# Patient Record
Sex: FEMALE | Race: WHITE | NOT HISPANIC OR LATINO | Employment: FULL TIME | URBAN - METROPOLITAN AREA
[De-identification: names, ages, dates, MRNs, and addresses within clinical notes are randomized per-mention and may not be internally consistent; named-entity substitution may affect disease eponyms.]

---

## 2018-01-29 PROBLEM — E88.819 INSULIN RESISTANCE: Status: ACTIVE | Noted: 2018-01-29

## 2018-02-06 ENCOUNTER — TELEPHONE (OUTPATIENT)
Dept: CARDIOLOGY | Facility: CLINIC | Age: 28
End: 2018-02-06

## 2018-02-06 NOTE — TELEPHONE ENCOUNTER
----- Message from Guru Thacker sent at 2/5/2018  4:10 PM CST -----  The pt is seeking to have an appt scheduled 2/9th for an echocardiogram.  An appt is not available until 2/12/18.  The pt can be reached at [phone]

## 2018-02-06 NOTE — TELEPHONE ENCOUNTER
Returned call to schedule echocardiogram as per Dr. Edmondson.  Pt had requested Feb 9th, offered her appt at either 7am or 7:30am.  She initially did not want to schedule, needs to know copay amount (states she has high deductible plan).  Discussed options, offered to have financial counselor reach out to pt.  She agreed to schedule echocardiogram at [facility], Feb 9, 7:30am.

## 2018-11-06 PROBLEM — F41.9 ANXIETY: Status: ACTIVE | Noted: 2018-11-06

## 2018-11-08 PROBLEM — Z79.899 HIGH RISK MEDICATION USE: Status: ACTIVE | Noted: 2018-11-08

## 2019-11-14 ENCOUNTER — PATIENT MESSAGE (OUTPATIENT)
Dept: ENDOCRINOLOGY | Facility: CLINIC | Age: 29
End: 2019-11-14

## 2021-04-29 ENCOUNTER — PATIENT MESSAGE (OUTPATIENT)
Dept: RESEARCH | Facility: HOSPITAL | Age: 31
End: 2021-04-29

## 2021-10-29 RX ORDER — CLOMIPHENE CITRATE 50 MG/1
TABLET ORAL
COMMUNITY
Start: 2021-05-04 | End: 2021-11-01

## 2021-10-29 RX ORDER — CYCLOBENZAPRINE HCL 10 MG
TABLET ORAL
COMMUNITY
End: 2021-11-16

## 2021-10-29 RX ORDER — NORETHINDRONE ACETATE AND ETHINYL ESTRADIOL AND FERROUS FUMARATE 1MG-20(21)
KIT ORAL
COMMUNITY
Start: 2021-05-28 | End: 2021-11-16

## 2021-10-29 RX ORDER — ONDANSETRON 4 MG/1
TABLET, ORALLY DISINTEGRATING ORAL
COMMUNITY
End: 2021-11-16

## 2021-10-29 RX ORDER — ONDANSETRON 4 MG/1
TABLET, ORALLY DISINTEGRATING ORAL
COMMUNITY
Start: 2021-10-16 | End: 2022-05-04

## 2021-10-29 RX ORDER — NIFEDIPINE 60 MG/1
TABLET, EXTENDED RELEASE ORAL
COMMUNITY
Start: 2020-11-02 | End: 2021-11-16

## 2021-10-29 RX ORDER — CITALOPRAM 10 MG/1
TABLET ORAL
COMMUNITY
End: 2021-11-16 | Stop reason: SDUPTHER

## 2021-10-29 RX ORDER — MEDROXYPROGESTERONE ACETATE 10 MG/1
TABLET ORAL
COMMUNITY
Start: 2021-07-06 | End: 2021-11-16

## 2021-10-29 RX ORDER — DILTIAZEM HYDROCHLORIDE 60 MG/1
TABLET, FILM COATED ORAL
COMMUNITY
End: 2021-11-16

## 2021-10-29 RX ORDER — LEUCOVORIN/PYRIDOX/MECOBALAMIN 4-50-2 MG
TABLET ORAL
COMMUNITY
End: 2021-11-16 | Stop reason: SDUPTHER

## 2021-10-29 RX ORDER — PROGESTERONE 200 MG/1
CAPSULE ORAL
COMMUNITY
Start: 2021-10-15 | End: 2021-11-16 | Stop reason: SDUPTHER

## 2021-10-29 RX ORDER — PROGESTERONE 200 MG/1
CAPSULE ORAL
COMMUNITY
End: 2022-03-07

## 2021-10-29 RX ORDER — NORETHINDRONE ACETATE AND ETHINYL ESTRADIOL 1MG-20(21)
KIT ORAL
COMMUNITY
Start: 2021-03-17 | End: 2021-11-16

## 2021-10-29 RX ORDER — CITALOPRAM 10 MG/1
TABLET ORAL
COMMUNITY
Start: 2021-10-16 | End: 2022-01-06

## 2021-10-29 RX ORDER — PROMETHAZINE HYDROCHLORIDE 25 MG/1
TABLET ORAL
COMMUNITY
End: 2022-05-04

## 2021-10-29 RX ORDER — METFORMIN HYDROCHLORIDE 500 MG/1
TABLET, EXTENDED RELEASE ORAL
COMMUNITY
End: 2021-11-16 | Stop reason: SDUPTHER

## 2021-10-29 RX ORDER — PROMETHAZINE HYDROCHLORIDE 25 MG/1
TABLET ORAL
COMMUNITY
Start: 2021-10-16 | End: 2021-11-16 | Stop reason: SDUPTHER

## 2021-10-29 RX ORDER — METFORMIN HYDROCHLORIDE 500 MG/1
TABLET, EXTENDED RELEASE ORAL
COMMUNITY
Start: 2021-10-16 | End: 2022-06-16 | Stop reason: SDUPTHER

## 2021-10-29 RX ORDER — ASCORBIC ACID, CHOLECALCIFEROL, DL-.ALPHA.-TOCOPHEROL ACETATE, THIAMINE HYDROCHLORIDE, RIBOFLAVIN, NIACINAMIDE, PYRIDOXINE HYDROCHLORIDE, FOLIC ACID, CYANOCOBALAMIN, CALCIUM CARBONATE, FERROUS FUMARATE, ZINC OXIDE, CUPRIC SULFATE 100; 400; 30; 1.6; 1.6; 20; 3.1; 1; 12; 200; 27; 10; 2 MG/1; [IU]/1; [IU]/1; MG/1; MG/1; MG/1; MG/1; MG/1; UG/1; MG/1; MG/1; MG/1; MG/1
TABLET, COATED ORAL
COMMUNITY
Start: 2020-10-15 | End: 2022-03-07

## 2021-10-29 RX ORDER — SERTRALINE HYDROCHLORIDE 50 MG/1
TABLET, FILM COATED ORAL
COMMUNITY
Start: 2021-01-04 | End: 2021-11-16

## 2021-11-08 ENCOUNTER — PATIENT MESSAGE (OUTPATIENT)
Dept: ADMINISTRATIVE | Facility: OTHER | Age: 31
End: 2021-11-08
Payer: COMMERCIAL

## 2021-11-15 ENCOUNTER — PATIENT MESSAGE (OUTPATIENT)
Dept: ADMINISTRATIVE | Facility: OTHER | Age: 31
End: 2021-11-15
Payer: COMMERCIAL

## 2022-04-25 DIAGNOSIS — G51.0 BELL PALSY: Primary | ICD-10-CM

## 2022-04-26 ENCOUNTER — TELEPHONE (OUTPATIENT)
Dept: NEUROLOGY | Facility: CLINIC | Age: 32
End: 2022-04-26
Payer: COMMERCIAL

## 2022-04-26 NOTE — TELEPHONE ENCOUNTER
----- Message from Piedad Munson MD sent at 4/25/2022  5:04 PM CDT -----  Regarding: RE: Postpartum, suspect Bell's Palsy  Sure.     Let's schedule her for facial NCS and see her ASAP.      ----- Message -----  From: Dorcas García MD  Sent: 4/25/2022   4:24 PM CDT  To: Piedda Munson MD  Subject: Postpartum, suspect Bell's Palsy                 Good Afternoon,  My name is Dorcas García and I am an Ob/gyn with [facility].  My pt has h/o hypertension, pre eclampsia and psuedotumor who delivered by c/s 4 days ago for pre eclampsia.  On postpartum day # 1 she developed facial droop. With Neg CT.  Pt now on 2 bp meds and not in severe range but had some in severe range while in hospital and required iv bp meds.  I started her on Valtrex and steroids.  She is also having significant ear pain requiring oxycodone 10 mg every 3 hours.  When we called for appt with [facility] neurology,  the soonest available was going to be in October.  Is there any way that she could be seen sooner?  Please let me know as soon as you find out so I can help coordinate her care.      Thanks,   Dorcas García MD  [phone]

## 2022-04-28 ENCOUNTER — PROCEDURE VISIT (OUTPATIENT)
Dept: NEUROLOGY | Facility: CLINIC | Age: 32
End: 2022-04-28
Payer: COMMERCIAL

## 2022-04-28 DIAGNOSIS — G51.0 BELL PALSY: ICD-10-CM

## 2022-04-28 PROCEDURE — 95907 NVR CNDJ TST 1-2 STUDIES: CPT | Mod: S$GLB,,, | Performed by: PSYCHIATRY & NEUROLOGY

## 2022-04-28 PROCEDURE — 95907 PR NERVE CONDUCTION STUDY; 1-2 STUDIES: ICD-10-PCS | Mod: S$GLB,,, | Performed by: PSYCHIATRY & NEUROLOGY

## 2022-04-29 PROBLEM — G51.0 BELL PALSY: Status: ACTIVE | Noted: 2022-04-29

## 2022-04-29 NOTE — PROCEDURES
[facility]   Ariel Gunn  Department of Neurology  [address] [place] [address] [place]  [address]  Phone [phone]     Fax  [phone]        Patient: Maximiliano Hicks YOB: 1990  Patient ID: [id number] Age: 31 Years 10 Months  Sex: Female Ref. Provider: Piedad Munson MD  Notes: NCS/Facial nerve/Bell's palsy/LT facial droop. C/O: Developed left facial droop postpartum day #1, s/p c/s 4/21/22 due to pre-eclampsia. PMHX: Pre-eclampsia, pseudotumor cerebri, HTN, gestational DM requiring insulin, labyrinthine disorder, anxiety.      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      The preservation and relative symmetry of the facial motor amplitudes implies a predominantly demyelinating lesion with a better prognosis.     ---------------------------------               Piedad Munson M.D., F.A.A.N.      Diplomate, American Board of Psychiatry and Neurology  Diplomate, American Board of Clinical Neurophysiology   Fellow, American Academy of Neurology       Motor NCS      Nerve / Sites Rec. Site Lat Amp Dist     ms mV cm   R FACIAL - Nasalis      Mastoid Nasalis 2.86 2.5 12   L FACIAL - Nasalis      Mastoid Nasalis 2.03 4.1 12   R FACIAL - OrbOculi      Mastoid OrbOculi 2.92 2.1 9   L FACIAL - OrbOculi      Mastoid OrbOculi 3.33 0.6 10   R FACIAL - OrbOris      Mastoid OrbOris 1.93 2.6 8.5   L FACIAL - OrbOris      Mastoid OrbOris 3.13 1.4 9

## 2022-05-03 ENCOUNTER — TELEPHONE (OUTPATIENT)
Dept: OTOLARYNGOLOGY | Facility: CLINIC | Age: 32
End: 2022-05-03

## 2022-05-03 ENCOUNTER — PATIENT MESSAGE (OUTPATIENT)
Dept: OTOLARYNGOLOGY | Facility: CLINIC | Age: 32
End: 2022-05-03

## 2022-05-03 ENCOUNTER — OFFICE VISIT (OUTPATIENT)
Dept: OTOLARYNGOLOGY | Facility: CLINIC | Age: 32
End: 2022-05-03
Payer: COMMERCIAL

## 2022-05-03 ENCOUNTER — CLINICAL SUPPORT (OUTPATIENT)
Dept: AUDIOLOGY | Facility: CLINIC | Age: 32
End: 2022-05-03
Payer: COMMERCIAL

## 2022-05-03 VITALS — WEIGHT: 220.88 LBS | TEMPERATURE: 98 F | BODY MASS INDEX: 40.4 KG/M2

## 2022-05-03 DIAGNOSIS — H93.12 LEFT-SIDED TINNITUS: Primary | ICD-10-CM

## 2022-05-03 DIAGNOSIS — G51.0 FACIAL NERVE PALSY: Primary | ICD-10-CM

## 2022-05-03 DIAGNOSIS — G89.29 CHRONIC FACIAL PAIN: ICD-10-CM

## 2022-05-03 DIAGNOSIS — R51.9 CHRONIC FACIAL PAIN: ICD-10-CM

## 2022-05-03 DIAGNOSIS — H93.12 TINNITUS, LEFT: ICD-10-CM

## 2022-05-03 PROCEDURE — 1159F MED LIST DOCD IN RCRD: CPT | Mod: CPTII,S$GLB,, | Performed by: OTOLARYNGOLOGY

## 2022-05-03 PROCEDURE — 92567 PR TYMPA2METRY: ICD-10-PCS | Mod: S$GLB,,,

## 2022-05-03 PROCEDURE — 92567 TYMPANOMETRY: CPT | Mod: S$GLB,,,

## 2022-05-03 PROCEDURE — 1160F RVW MEDS BY RX/DR IN RCRD: CPT | Mod: CPTII,S$GLB,, | Performed by: OTOLARYNGOLOGY

## 2022-05-03 PROCEDURE — 1160F PR REVIEW ALL MEDS BY PRESCRIBER/CLIN PHARMACIST DOCUMENTED: ICD-10-PCS | Mod: CPTII,S$GLB,, | Performed by: OTOLARYNGOLOGY

## 2022-05-03 PROCEDURE — 99999 PR PBB SHADOW E&M-EST. PATIENT-LVL IV: ICD-10-PCS | Mod: PBBFAC,,, | Performed by: OTOLARYNGOLOGY

## 2022-05-03 PROCEDURE — 99999 PR PBB SHADOW E&M-EST. PATIENT-LVL I: ICD-10-PCS | Mod: PBBFAC,,,

## 2022-05-03 PROCEDURE — 92553 PR AUDIOMETRY, AIR & BONE: ICD-10-PCS | Mod: S$GLB,,,

## 2022-05-03 PROCEDURE — 3008F BODY MASS INDEX DOCD: CPT | Mod: CPTII,S$GLB,, | Performed by: OTOLARYNGOLOGY

## 2022-05-03 PROCEDURE — 92553 AUDIOMETRY AIR & BONE: CPT | Mod: S$GLB,,,

## 2022-05-03 PROCEDURE — 3008F PR BODY MASS INDEX (BMI) DOCUMENTED: ICD-10-PCS | Mod: CPTII,S$GLB,, | Performed by: OTOLARYNGOLOGY

## 2022-05-03 PROCEDURE — 1159F PR MEDICATION LIST DOCUMENTED IN MEDICAL RECORD: ICD-10-PCS | Mod: CPTII,S$GLB,, | Performed by: OTOLARYNGOLOGY

## 2022-05-03 PROCEDURE — 99999 PR PBB SHADOW E&M-EST. PATIENT-LVL I: CPT | Mod: PBBFAC,,,

## 2022-05-03 PROCEDURE — 99204 OFFICE O/P NEW MOD 45 MIN: CPT | Mod: S$GLB,,, | Performed by: OTOLARYNGOLOGY

## 2022-05-03 PROCEDURE — 99204 PR OFFICE/OUTPT VISIT, NEW, LEVL IV, 45-59 MIN: ICD-10-PCS | Mod: S$GLB,,, | Performed by: OTOLARYNGOLOGY

## 2022-05-03 PROCEDURE — 99999 PR PBB SHADOW E&M-EST. PATIENT-LVL IV: CPT | Mod: PBBFAC,,, | Performed by: OTOLARYNGOLOGY

## 2022-05-03 RX ORDER — OXYCODONE HYDROCHLORIDE 10 MG/1
TABLET ORAL
COMMUNITY
Start: 2022-04-25 | End: 2022-06-20

## 2022-05-03 RX ORDER — GABAPENTIN 300 MG/1
300 CAPSULE ORAL 3 TIMES DAILY
Qty: 90 CAPSULE | Refills: 11 | OUTPATIENT
Start: 2022-05-03 | End: 2023-05-01

## 2022-05-03 RX ORDER — LABETALOL 200 MG/1
TABLET, FILM COATED ORAL
COMMUNITY
Start: 2022-04-25 | End: 2022-05-31 | Stop reason: SDUPTHER

## 2022-05-03 RX ORDER — VALACYCLOVIR HYDROCHLORIDE 1 G/1
TABLET, FILM COATED ORAL
COMMUNITY
Start: 2022-04-25 | End: 2022-06-20

## 2022-05-03 NOTE — PROGRESS NOTES
Referring Provider:    Stacyreferral Self  No address on file  Subjective:   Patient: Fabiola Viera [id number], :1990   Visit date:5/3/2022 9:58 AM    Chief Complaint:  Otalgia (Pain started a little before she had her baby, developed Bell's palsy, palsy has gotten better but pain in right ear causes her to take medicine every 6 hours for it)    HPI:    Prior notes reviewed by myself.  Clinical documentation obtained by nursing staff reviewed.     31-year-old female presents with complaints of left-sided facial weakness and left-sided facial pain.  She recently had a  and prior to delivery she had preeclampsia.  Shortly after delivery she developed a left-sided facial weakness and left-sided facial pain.  She was treated appropriately with valacyclovir as well as high-dose prednisone.  She denies any rash or skin changes at this time.  Since onset, the facial weakness has improved slightly but the pain has persisted and she is requiring scheduled narcotic and OTC pain medication.  She did have a CT scan at [facility] which she reports was normal although the official radiology report is not available.  She has had a nerve conduction study with Dr. Munson (neurology) but has not seen him for a clinic visit yet.  She reports intermittent nonpulsatile left-sided tinnitus.  She does not have any other significant otologic history      Objective:     Physical Exam:  Vitals:  Temp 97.7 °F (36.5 °C) (Temporal)   Wt 100.2 kg (220 lb 14.4 oz)   BMI 40.40 kg/m²   General appearance:  Well developed, well nourished    Ears:  Otoscopy of external auditory canals and tympanic membranes was normal, clinical speech reception thresholds grossly intact, no mass/lesion of auricle.    Nose:  No masses/lesions of external nose, nasal mucosa, septum, and turbinates were within normal limits.    Mouth:  No mass/lesion of lips, teeth, gums, hard/soft palate, tongue, tonsils, or oropharynx.    Neck & Lymphatics:  No  cervical lymphadenopathy, no neck mass/crepitus/ asymmetry, trachea is midline, no thyroid enlargement/tenderness/mass.    Neuro:  CN II-VI intact, CN VII 3/6 left and 1/6 right, CN VIII - XII intact bilaterally        [x]  Data Reviewed:    Lab Results   Component Value Date    WBC 7.27 04/20/2022    HGB 11.5 (A) 04/20/2022    HCT 36.3 (A) 04/20/2022    MCV 85.6 04/20/2022    EOSINOPHIL 0.8 04/20/2022     [facility]   Ariel Gunn  Department of Neurology  [address] [place] [address] [place]  [address]  Phone [phone]     Fax  [phone]         Patient:         Maximiliano Hicks                YOB: 1990  Patient ID:     [id number]                 Age:                      31 Years 10 Months  Sex:               Female                     Ref. Provider:      Piedad Munson MD  Notes:           NCS/Facial nerve/Bell's palsy/LT facial droop. C/O: Developed left facial droop postpartum day #1, s/p c/s 4/21/22 due to pre-eclampsia. PMHX: Pre-eclampsia, pseudotumor cerebri, HTN, gestational DM requiring insulin, labyrinthine disorder, anxiety.      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       The preservation and relative symmetry of the facial motor amplitudes implies a predominantly demyelinating lesion with a better prognosis.      ---------------------------------                          Piedad Munson M.D., F.A.A.N.        Diplomate, American Board of Psychiatry and Neurology  Diplomate, American Board of Clinical Neurophysiology   Fellow, American Academy of Neurology         Motor NCS      Nerve / Sites Rec. Site Lat Amp Dist       ms mV cm   R FACIAL - Nasalis      Mastoid Nasalis 2.86 2.5 12   L FACIAL - Nasalis      Mastoid Nasalis 2.03 4.1 12   R FACIAL - OrbOculi      Mastoid  OrbOculi 2.92 2.1 9   L FACIAL - OrbOculi      Mastoid OrbOculi 3.33 0.6 10   R FACIAL - OrbOris      Mastoid OrbOris 1.93 2.6 8.5   L FACIAL - OrbOris      Mastoid OrbOris 3.13 1.4 9                     Instructions     After Visit Summary (Automatic SnapShot taken 4/29/2022)                    All Charges for This Encounter    Code Description Service Date Service Provider Modifiers Qty   [address] [place] NERVE CONDUCTION STUDY; 1-2 STUDIES 4/28/2022 Piedad Munson MD S$GLB 1       Encounter-Level Documents on 04/28/2022:    After Visit Summary - Document on 4/29/2022 8:10 AM by Piedad Munson MD: After Visit Summary           Not recorded    Media Information                File Link    Annotation on 5/3/2022 10:36 AM by Lexa Tellez CCC-A: AUDIOGRAM        Key Information    Document ID File Type Document Type Description   [id number] Annotation Annotation AUDIOGRAM       Import Information    Attached At Date Time User Dept   Encounter Level 5/3/2022 10:36 AM Lexa Tellez CCC-A [facility]       Encounter    Appointment on 5/3/22 with Lexa Tellez, CCC-MAURICIO       Media Audit Information    Patient Entered Attachment was moved from this patient by [facility], System Message on 11/15/2021 at 2:12 PM (DCS ID: [id number], File: [id number])               Assessment & Plan:   Facial nerve palsy    Chronic facial pain  -     gabapentin (NEURONTIN) 300 MG capsule; Take 1 capsule (300 mg total) by mouth 3 (three) times daily.  Dispense: 90 capsule; Refill: 11    Tinnitus, left        She has continued left-sided severe facial pain as well as and improving left-sided facial weakness. Audio was normal.  She is scheduled to see Neurology tomorrow.  We discussed her facial pain and I agreed to start her on Neurontin, but I explained that I would defer to Neurology for management of this likely neuropathic pain.  Her history and subsequent facial pain are suspicious for herpes zoster  oticus; although, no rash was noted.      She has full eye closure but we still discussed eye precautions and dental precautions with regard to her facial weakness.

## 2022-05-03 NOTE — PROGRESS NOTES
Referring Provider: MD Fabiola Kiddpj Viera was seen 05/03/2022 for an audiological evaluation. Patient complains of left-sided facial weakness and left-sided facial pain following delivery. Patient noted the pain extends up behind her ear. Patient reported intermittent tinnitus in her left ear. Patient denied difficulties hearing and dizziness.       Otoscopy revealed clear canals with visualization of the tympanic membrane in both ears. Tympanograms were Type A for the right ear and Type A for the left ear. Audiometry revealed normal hearing sensitivity in both ears. Speech audiometry not completed on this date due to normal behavioral thresholds and no subjective hearing complaints.    Patient was counseled on the above findings.    Recommendations:  1. Follow-up with ENT, as scheduled.  2. Repeat audiological evaluation per ENT, or sooner if needed.

## 2022-05-04 ENCOUNTER — LAB VISIT (OUTPATIENT)
Dept: LAB | Facility: HOSPITAL | Age: 32
End: 2022-05-04
Attending: PSYCHIATRY & NEUROLOGY
Payer: COMMERCIAL

## 2022-05-04 ENCOUNTER — OFFICE VISIT (OUTPATIENT)
Dept: NEUROLOGY | Facility: CLINIC | Age: 32
End: 2022-05-04
Payer: COMMERCIAL

## 2022-05-04 VITALS
WEIGHT: 220.88 LBS | RESPIRATION RATE: 16 BRPM | OXYGEN SATURATION: 98 % | BODY MASS INDEX: 40.65 KG/M2 | HEART RATE: 97 BPM | DIASTOLIC BLOOD PRESSURE: 85 MMHG | SYSTOLIC BLOOD PRESSURE: 121 MMHG | HEIGHT: 62 IN

## 2022-05-04 DIAGNOSIS — G51.0 BELL PALSY: ICD-10-CM

## 2022-05-04 DIAGNOSIS — F41.9 ANXIETY: ICD-10-CM

## 2022-05-04 DIAGNOSIS — G51.0 BELL PALSY: Primary | ICD-10-CM

## 2022-05-04 DIAGNOSIS — H47.10 PAPILLEDEMA: ICD-10-CM

## 2022-05-04 DIAGNOSIS — R76.8 ANA POSITIVE: ICD-10-CM

## 2022-05-04 LAB
CRP SERPL-MCNC: 13.2 MG/L (ref 0–8.2)
ERYTHROCYTE [SEDIMENTATION RATE] IN BLOOD BY WESTERGREN METHOD: 27 MM/HR (ref 0–20)

## 2022-05-04 PROCEDURE — 3008F PR BODY MASS INDEX (BMI) DOCUMENTED: ICD-10-PCS | Mod: CPTII,S$GLB,, | Performed by: PSYCHIATRY & NEUROLOGY

## 2022-05-04 PROCEDURE — 82164 ANGIOTENSIN I ENZYME TEST: CPT | Performed by: PSYCHIATRY & NEUROLOGY

## 2022-05-04 PROCEDURE — 86235 NUCLEAR ANTIGEN ANTIBODY: CPT | Mod: 59 | Performed by: PSYCHIATRY & NEUROLOGY

## 2022-05-04 PROCEDURE — 1159F MED LIST DOCD IN RCRD: CPT | Mod: CPTII,S$GLB,, | Performed by: PSYCHIATRY & NEUROLOGY

## 2022-05-04 PROCEDURE — 3079F PR MOST RECENT DIASTOLIC BLOOD PRESSURE 80-89 MM HG: ICD-10-PCS | Mod: CPTII,S$GLB,, | Performed by: PSYCHIATRY & NEUROLOGY

## 2022-05-04 PROCEDURE — 99999 PR PBB SHADOW E&M-EST. PATIENT-LVL V: ICD-10-PCS | Mod: PBBFAC,,, | Performed by: PSYCHIATRY & NEUROLOGY

## 2022-05-04 PROCEDURE — 87389 HIV-1 AG W/HIV-1&-2 AB AG IA: CPT | Performed by: PSYCHIATRY & NEUROLOGY

## 2022-05-04 PROCEDURE — 99215 PR OFFICE/OUTPT VISIT, EST, LEVL V, 40-54 MIN: ICD-10-PCS | Mod: S$GLB,,, | Performed by: PSYCHIATRY & NEUROLOGY

## 2022-05-04 PROCEDURE — 86038 ANTINUCLEAR ANTIBODIES: CPT | Performed by: PSYCHIATRY & NEUROLOGY

## 2022-05-04 PROCEDURE — 86140 C-REACTIVE PROTEIN: CPT | Performed by: PSYCHIATRY & NEUROLOGY

## 2022-05-04 PROCEDURE — 99417 PR PROLONGED SVC, OUTPT, W/WO DIRECT PT CONTACT,  EA ADDTL 15 MIN: ICD-10-PCS | Mod: S$GLB,,, | Performed by: PSYCHIATRY & NEUROLOGY

## 2022-05-04 PROCEDURE — 99215 OFFICE O/P EST HI 40 MIN: CPT | Mod: S$GLB,,, | Performed by: PSYCHIATRY & NEUROLOGY

## 2022-05-04 PROCEDURE — 85651 RBC SED RATE NONAUTOMATED: CPT | Performed by: PSYCHIATRY & NEUROLOGY

## 2022-05-04 PROCEDURE — 1159F PR MEDICATION LIST DOCUMENTED IN MEDICAL RECORD: ICD-10-PCS | Mod: CPTII,S$GLB,, | Performed by: PSYCHIATRY & NEUROLOGY

## 2022-05-04 PROCEDURE — 3079F DIAST BP 80-89 MM HG: CPT | Mod: CPTII,S$GLB,, | Performed by: PSYCHIATRY & NEUROLOGY

## 2022-05-04 PROCEDURE — 3008F BODY MASS INDEX DOCD: CPT | Mod: CPTII,S$GLB,, | Performed by: PSYCHIATRY & NEUROLOGY

## 2022-05-04 PROCEDURE — 99417 PROLNG OP E/M EACH 15 MIN: CPT | Mod: S$GLB,,, | Performed by: PSYCHIATRY & NEUROLOGY

## 2022-05-04 PROCEDURE — 86039 ANTINUCLEAR ANTIBODIES (ANA): CPT | Performed by: PSYCHIATRY & NEUROLOGY

## 2022-05-04 PROCEDURE — 3074F SYST BP LT 130 MM HG: CPT | Mod: CPTII,S$GLB,, | Performed by: PSYCHIATRY & NEUROLOGY

## 2022-05-04 PROCEDURE — 3074F PR MOST RECENT SYSTOLIC BLOOD PRESSURE < 130 MM HG: ICD-10-PCS | Mod: CPTII,S$GLB,, | Performed by: PSYCHIATRY & NEUROLOGY

## 2022-05-04 PROCEDURE — 86431 RHEUMATOID FACTOR QUANT: CPT | Performed by: PSYCHIATRY & NEUROLOGY

## 2022-05-04 PROCEDURE — 36415 COLL VENOUS BLD VENIPUNCTURE: CPT | Performed by: PSYCHIATRY & NEUROLOGY

## 2022-05-04 PROCEDURE — 99999 PR PBB SHADOW E&M-EST. PATIENT-LVL V: CPT | Mod: PBBFAC,,, | Performed by: PSYCHIATRY & NEUROLOGY

## 2022-05-04 PROCEDURE — 86225 DNA ANTIBODY NATIVE: CPT | Performed by: PSYCHIATRY & NEUROLOGY

## 2022-05-04 PROCEDURE — 86618 LYME DISEASE ANTIBODY: CPT | Performed by: PSYCHIATRY & NEUROLOGY

## 2022-05-04 NOTE — PROGRESS NOTES
"Subjective:       Patient ID: Fabiola Viera is a 31 y.o. female.    Chief Complaint: No chief complaint on file.          HPI       The patient developed  facial drooping in the LT side on 2022  (1 day after ). About 2 days prior to the facial weakness she developed neck pain. The drooping involved the whole side of the face. The patient also admits to distorted taste sensation and decreased tearing from the eye. The patient is having difficulty closing the LT eye. The patient describes that noise is becoming very loud and painful perceived by the ear. The patient denies trauma. No double vision. No hearing loss. No trouble breathing or swallowing. No slurring of speech. No arm or leg weakness or numbness. No severe headache. No history of tick bites. No history of joint pain or skin rash. No lymph node enlargement. The patient did not notice vesicular lesions in the ear or face. No dizziness. No balance problems. CTH was NL. Was properly treated with Steroids and Valtrex. She is on GBP titration to 300 mg TID. On 2022 NCS/EMG confirmed left facial  Mononeuropathy (the preservation and relative symmetry of the facial motor amplitudes implies a predominantly demyelinating lesion with a better prognosis).    In , while pregnant, she underwent eye examination that showed "papilledema". Underwent Brain MRIs that were NL. Was diagnosed with IIH (PTC) despite being completely asymptomatic with no headache and no visual changes. The patient has been on Diamox 500 mg BID.          Review of Systems   Constitutional: Negative for appetite change and fatigue.   HENT: Negative for hearing loss and tinnitus.    Eyes: Negative for photophobia and visual disturbance.   Respiratory: Negative for apnea and shortness of breath.    Cardiovascular: Negative for chest pain and palpitations.   Gastrointestinal: Negative for nausea and vomiting.   Endocrine: Negative for cold intolerance and heat " Phone attempt-no answer but left voicemail advising Md response.   intolerance.   Genitourinary: Negative for difficulty urinating and urgency.   Musculoskeletal: Negative for arthralgias, back pain, gait problem, joint swelling, myalgias, neck pain and neck stiffness.   Skin: Negative for color change and rash.   Allergic/Immunologic: Negative for environmental allergies and immunocompromised state.   Neurological: Positive for facial asymmetry. Negative for dizziness, tremors, seizures, syncope, speech difficulty, weakness, light-headedness, numbness and headaches.   Hematological: Negative for adenopathy. Does not bruise/bleed easily.   Psychiatric/Behavioral: Negative for agitation, behavioral problems, confusion, decreased concentration, dysphoric mood, hallucinations, self-injury, sleep disturbance and suicidal ideas. The patient is not hyperactive.                  Current Outpatient Medications:     acetaZOLAMIDE (DIAMOX) 500 mg CpSR, , Disp: , Rfl:     citalopram (CELEXA) 20 MG tablet, Take 1 tablet (20 mg total) by mouth once daily., Disp: 30 tablet, Rfl: 11    gabapentin (NEURONTIN) 300 MG capsule, Take 1 capsule (300 mg total) by mouth 3 (three) times daily., Disp: 90 capsule, Rfl: 11    labetaloL (NORMODYNE) 200 MG tablet, , Disp: , Rfl:     leucovorin/pyridox/mecobalamin (FOLINIC-PLUS ORAL), Take by mouth., Disp: , Rfl:     metFORMIN (GLUCOPHAGE-XR) 500 MG ER 24hr tablet, , Disp: , Rfl:     NIFEdipine (PROCARDIA-XL) 60 MG (OSM) 24 hr tablet, Take 1 tablet (60 mg total) by mouth 2 (two) times a day., Disp: 60 tablet, Rfl: 3    nitrofurantoin, macrocrystal-monohydrate, (MACROBID) 100 MG capsule, Take 1 capsule (100 mg total) by mouth 2 (two) times daily. for 7 days, Disp: 14 capsule, Rfl: 0    oxyCODONE (ROXICODONE) 10 mg Tab immediate release tablet, , Disp: , Rfl:     valACYclovir (VALTREX) 1000 MG tablet, , Disp: , Rfl:   Past Medical History:   Diagnosis Date    Abnormal Pap smear of cervix     Anxiety     Encounter for general adult medical examination  without abnormal findings 2016    Encounter for screening for infections with predominantly sexual mode of transmission     Hypertension     Insulin resistance     Insulin resistance     Insulin resistance     Irregular menses     Labyrinthine disorder     Polycystic ovaries     Sciatica      Past Surgical History:   Procedure Laterality Date    ADENOIDECTOMY       SECTION      TONSILLECTOMY       Social History     Socioeconomic History    Marital status:    Tobacco Use    Smoking status: Never Smoker    Smokeless tobacco: Never Used   Substance and Sexual Activity    Alcohol use: Not Currently    Drug use: Not Currently    Sexual activity: Yes     Partners: Male             Past/Current Medical/Surgical History, Past/Current Social History, Past/Current Family History and Past/Current Medications were reviewed in detail.        Objective:           VITAL SIGNS WERE REVIEWED      GENERAL APPEARANCE:     The patient looks uncomfortable.    BMI 40.40    No signs of respiratory distress.    Normal breathing pattern.    No dysmorphic features    Normal eye contact.     GENERAL MEDICAL EXAM:    HEENT:  Head is atraumatic normocephalic. Fundoscopic (Ophthalmoscopic) exam showed no disc edema.      Neck and Axillae: No JVD. No visible lesions.    Cardiopulmonary: No cyanosis. No tachypnea. Normal respiratory effort.    Gastrointestinal/Urogenital:  No jaundice. No stomas or lesions. No visible hernias. No catheters.     Skin, Hair and Nails: No pathognonomic skin rash. No neurofibromatosis. No visible lesions.No stigmata of autoimmune disease. No clubbing.    Limbs: No varicose veins. No visible swelling.    Muskoskeletal: No visible deformities.No visible lesions.           Neurologic Exam     Mental Status   Oriented to person, place, and time.   Follows 3 step commands.   Attention: normal. Concentration: normal.   Speech: speech is normal   Level of consciousness: alert  Able to  name object. Able to repeat. Normal comprehension.     Cranial Nerves     CN II   Visual fields full to confrontation.   Visual acuity: normal  Right visual field deficit: none  Left visual field deficit: none     CN III, IV, VI   Pupils are equal, round, and reactive to light.  Extraocular motions are normal.   Right pupil: Size: 2 mm. Shape: regular. Reactivity: brisk. Consensual response: intact. Accommodation: intact.   Left pupil: Size: 2 mm. Shape: regular. Reactivity: brisk. Consensual response: intact. Accommodation: intact.   CN III: no CN III palsy  CN VI: no CN VI palsy  Nystagmus: none   Diplopia: none  Ophthalmoparesis: none  Upgaze: normal  Downgaze: normal  Conjugate gaze: present  Vestibulo-ocular reflex: present    CN V   Facial sensation intact.   Right facial sensation deficit: none  Left facial sensation deficit: none  Jaw jerk: normal    CN VII   Right facial weakness: none  Left facial weakness: peripheral (Hyperacusia-Dysgeusia )  Right taste: normal  Left taste: abnormal    CN VIII   CN VIII normal.   Hearing: intact    CN IX, X   CN IX normal.   CN X normal.   Palate: symmetric    CN XI   CN XI normal.   Right sternocleidomastoid strength: normal  Left sternocleidomastoid strength: normal  Right trapezius strength: normal  Left trapezius strength: normal    CN XII   CN XII normal.   Tongue: not atrophic  Fasciculations: absent  Tongue deviation: none    Motor Exam   Muscle bulk: normal  Overall muscle tone: normal  Right arm tone: normal  Left arm tone: normal  Right arm pronator drift: absent  Left arm pronator drift: absent  Right leg tone: normal  Left leg tone: normal    Strength   Strength 5/5 throughout.   Right neck flexion: 5/5  Left neck flexion: 5/5  Right neck extension: 5/5  Left neck extension: 5/5  Right deltoid: 5/5  Left deltoid: 5/5  Right biceps: 5/5  Left biceps: 5/5  Right triceps: 5/5  Left triceps: 5/5  Right wrist flexion: 5/5  Left wrist flexion: 5/5  Right wrist  extension: 5/5  Left wrist extension: 5/5  Right interossei: 5/5  Left interossei: 5/5  Right iliopsoas: 5/5  Left iliopsoas: 5/5  Right quadriceps: 5/5  Left quadriceps: 5/5  Right hamstrin/5  Left hamstrin/5  Right glutei: 5/5  Left glutei: 5/5  Right anterior tibial: 5/5  Left anterior tibial: 5/5  Right posterior tibial: 5/5  Left posterior tibial: 5/5  Right peroneal: 5/5  Left peroneal: 5/5  Right gastroc: 5/5  Left gastroc: 5/5    Sensory Exam   Light touch normal.   Right arm light touch: normal  Left arm light touch: normal  Right leg light touch: normal  Left leg light touch: normal  Vibration normal.   Right arm vibration: normal  Left arm vibration: normal  Right leg vibration: normal  Left leg vibration: normal  Proprioception normal.   Right arm proprioception: normal  Left arm proprioception: normal  Right leg proprioception: normal  Left leg proprioception: normal  Pinprick normal.   Right arm pinprick: normal  Left arm pinprick: normal  Right leg pinprick: normal  Left leg pinprick: normal  Graphesthesia: normal  Stereognosis: normal    Gait, Coordination, and Reflexes     Gait  Gait: normal    Coordination   Romberg: negative  Finger to nose coordination: normal  Heel to shin coordination: normal  Tandem walking coordination: normal    Tremor   Resting tremor: absent  Intention tremor: absent  Action tremor: absent    Reflexes   Right brachioradialis: 2+  Left brachioradialis: 2+  Right biceps: 2+  Left biceps: 2+  Right triceps: 2+  Left triceps: 2+  Right patellar: 2+  Left patellar: 2+  Right achilles: 2+  Left achilles: 2+  Right plantar: normal  Left plantar: normal  Right Michel: absent  Left Michel: absent  Right ankle clonus: absent  Left ankle clonus: absent  Right pendular knee jerk: absent  Left pendular knee jerk: absent      Lab Results   Component Value Date    WBC 7.27 2022    HGB 11.5 (A) 2022    HCT 36.3 (A) 2022    MCV 85.6 2022      04/20/2022     BUN   Date Value Ref Range Status   04/20/2022 11 5 - 17 mg/dL Final     Creatinine   Date Value Ref Range Status   04/20/2022 0.6 0.5 - 1.0 mg/dl Final     AST   Date Value Ref Range Status   04/20/2022 20 10 - 50 U/L Final     ALT   Date Value Ref Range Status   04/20/2022 15 <35 U/L Final     Comment:     **NOTE: Effective 1/22/20: ALT result represents a new  improved methodology. The Normal Range has changed  accordingly and previous ALT patient results should  not be compared to the current result.             Brain MRIs reported as NL     04-    CTH NL reported as NL      04-    NCS/EMG left facial  Mononeuropathy (the preservation and relative symmetry of the facial motor amplitudes implies a predominantly demyelinating lesion with a better prognosis).    05-    Labs    RAPHAEL +ve with elevated ESR and CRP.    RF, HIV, Lyme, ACE -ve    Reviewed the neuroimaging independently       Assessment:       1. Bell palsy    2. Anxiety    3. Papilledema        Plan:       LOWER MOTOR NEURON (LMN)  (PERIPHERAL) LT FACIAL PARESIS (BELL PALSY)    S/P STEROIDS AND VALTREX          Check HIV, RPR, ESR, CRP, RAPHAEL, RF, ACE and Lyme to rule out secondary causes of Bell Palsy.    LT Eye patching at bed time.    LT Ear plugging.    The patient was advised that the chance of good outcome is about 85-90% and the risk of recurrence is about 15%.    Continue  mg TID titration.         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    Dilated ophthalmology examination 2 weeks after stopping Diamox.    FL LP 2 weeks  after stopping Diamox to measure OP.      MEDICAL/SURGICAL COMORBIDITIES     All relevant medical comorbidities noted and managed by primary care physician and medical care team.          HEALTHY LIFESTYLE AND PREVENTATIVE CARE    The patient to adhere to the age-appropriate health maintenance guidelines including screening tests and vaccinations. The patient to adhere to  healthy lifestyle, optimal weight, exercise, healthy diet, good sleep hygiene and avoiding drugs including smoking, alcohol and recreational drugs.          Piedad Munson MD, FAAN    Attending Neurologist/Epileptologist         Diplomate, American Board of Psychiatry and Neurology    Diplomate, American Board of Clinical Neurophysiology     Fellow, American Academy of Neurology             I spent a total of 96 minutes on the day of the visit.  This includes face to face time and non-face to face time preparing to see the patient (eg, review of tests), obtaining and/or reviewing separately obtained history, documenting clinical information in the electronic or other health record, independently interpreting results and communicating results to the patient/family/caregiver, or care coordinator.

## 2022-05-05 LAB
ANA PATTERN 1: NORMAL
ANA PATTERN 2: NORMAL
ANA SER QL IF: POSITIVE
ANA TITER 2: NORMAL
ANA TITR SER IF: NORMAL {TITER}
RHEUMATOID FACT SERPL-ACNC: <13 IU/ML (ref 0–15)

## 2022-05-06 LAB
ACE SERPL-CCNC: 24 U/L (ref 16–85)
ANTI SM ANTIBODY: 0.07 RATIO (ref 0–0.99)
ANTI SM/RNP ANTIBODY: 0.06 RATIO (ref 0–0.99)
ANTI-SM INTERPRETATION: NEGATIVE
ANTI-SM/RNP INTERPRETATION: NEGATIVE
ANTI-SSA ANTIBODY: 0.05 RATIO (ref 0–0.99)
ANTI-SSA INTERPRETATION: NEGATIVE
ANTI-SSB ANTIBODY: 0.06 RATIO (ref 0–0.99)
ANTI-SSB INTERPRETATION: NEGATIVE
DSDNA AB SER-ACNC: NORMAL [IU]/ML
HIV 1+2 AB+HIV1 P24 AG SERPL QL IA: NEGATIVE

## 2022-05-09 ENCOUNTER — TELEPHONE (OUTPATIENT)
Dept: NEUROLOGY | Facility: CLINIC | Age: 32
End: 2022-05-09
Payer: COMMERCIAL

## 2022-05-09 LAB — B BURGDOR AB SER IA-ACNC: 0.05 INDEX VALUE

## 2022-05-09 NOTE — TELEPHONE ENCOUNTER
Angelo Mcdonnell Dr, Awad has placed a referral for pt  With a positive RAPHAEL  elevated ESR and CRP. Please contact pt with appointment . SUHAIL

## 2022-05-09 NOTE — TELEPHONE ENCOUNTER
----- Message from Piedad Munson MD sent at 5/9/2022  8:07 AM CDT -----  05-    Labs    RAPHAEL +ve with elevated ESR and CRP. Will refer to rheumatology for further evaluation.    RF, HIV, Lyme, ACE -ve

## 2022-05-09 NOTE — PROGRESS NOTES
05-    Labs    RAPHAEL +ve with elevated ESR and CRP. Will refer to rheumatology for further evaluation.    RF, HIV, Lyme, ACE -ve

## 2022-05-10 ENCOUNTER — PATIENT MESSAGE (OUTPATIENT)
Dept: NEUROLOGY | Facility: CLINIC | Age: 32
End: 2022-05-10
Payer: COMMERCIAL

## 2022-05-10 DIAGNOSIS — R76.8 ANA POSITIVE: Primary | ICD-10-CM

## 2022-05-11 ENCOUNTER — PATIENT MESSAGE (OUTPATIENT)
Dept: NEUROLOGY | Facility: CLINIC | Age: 32
End: 2022-05-11
Payer: COMMERCIAL

## 2022-05-23 ENCOUNTER — PATIENT MESSAGE (OUTPATIENT)
Dept: NEUROLOGY | Facility: CLINIC | Age: 32
End: 2022-05-23
Payer: COMMERCIAL

## 2022-06-10 ENCOUNTER — OFFICE VISIT (OUTPATIENT)
Dept: PODIATRY | Facility: CLINIC | Age: 32
End: 2022-06-10
Payer: COMMERCIAL

## 2022-06-10 DIAGNOSIS — L60.3 ONYCHODYSTROPHY: Primary | ICD-10-CM

## 2022-06-10 DIAGNOSIS — B35.3 TINEA PEDIS OF BOTH FEET: ICD-10-CM

## 2022-06-10 LAB — KOH PREP SPEC: NORMAL

## 2022-06-10 PROCEDURE — 1160F RVW MEDS BY RX/DR IN RCRD: CPT | Mod: CPTII,S$GLB,, | Performed by: PODIATRIST

## 2022-06-10 PROCEDURE — 99204 OFFICE O/P NEW MOD 45 MIN: CPT | Mod: S$GLB,,, | Performed by: PODIATRIST

## 2022-06-10 PROCEDURE — 1159F MED LIST DOCD IN RCRD: CPT | Mod: CPTII,S$GLB,, | Performed by: PODIATRIST

## 2022-06-10 PROCEDURE — 1159F PR MEDICATION LIST DOCUMENTED IN MEDICAL RECORD: ICD-10-PCS | Mod: CPTII,S$GLB,, | Performed by: PODIATRIST

## 2022-06-10 PROCEDURE — 87220 TISSUE EXAM FOR FUNGI: CPT | Performed by: PODIATRIST

## 2022-06-10 PROCEDURE — 99999 PR PBB SHADOW E&M-EST. PATIENT-LVL III: CPT | Mod: PBBFAC,,, | Performed by: PODIATRIST

## 2022-06-10 PROCEDURE — 1160F PR REVIEW ALL MEDS BY PRESCRIBER/CLIN PHARMACIST DOCUMENTED: ICD-10-PCS | Mod: CPTII,S$GLB,, | Performed by: PODIATRIST

## 2022-06-10 PROCEDURE — 99999 PR PBB SHADOW E&M-EST. PATIENT-LVL III: ICD-10-PCS | Mod: PBBFAC,,, | Performed by: PODIATRIST

## 2022-06-10 PROCEDURE — 99204 PR OFFICE/OUTPT VISIT, NEW, LEVL IV, 45-59 MIN: ICD-10-PCS | Mod: S$GLB,,, | Performed by: PODIATRIST

## 2022-06-10 RX ORDER — ACETAZOLAMIDE 500 MG/1
CAPSULE, EXTENDED RELEASE ORAL
COMMUNITY
Start: 2022-05-30 | End: 2022-06-20

## 2022-06-10 RX ORDER — KETOCONAZOLE 20 MG/G
CREAM TOPICAL 2 TIMES DAILY
Qty: 60 G | Refills: 1 | Status: SHIPPED | OUTPATIENT
Start: 2022-06-10

## 2022-06-10 NOTE — PROGRESS NOTES
Subjective:       Patient ID: Fabiola Viera is a 32 y.o. female.    Chief Complaint: Nail Problem (Patient reports thickened and yellowing of some nails to bilateral toes. She also complains of itching to webspaces. Denies pain at present. )      HPI:  Patient presents to the office today, with complaint of elongated, discolored and brittle nail plates to the left and right.  Patient states using over-the-counter topical medications which have not been helpful curative.  States that the initial right hallux nail became thickened and dystrophic after going to a nail salon.  States brittle nature and apparently the nail is growing overlying the nail bed.  Is not firmly attached.  Denies any trauma or injury.  Of note, patient is currently postpartum 7 weeks and is being worked up for an autoimmune disease.  Waiting for rheumatology consult.  She also is concerned of the texture to the skin to the plantar aspect of the right left foot.  States she has used over-the-counter powders and sprays and has not had no relief or improvement in to the texture. This patient's PMD is Isabel Edmondson MD.     Review of patient's allergies indicates:  No Known Allergies    Past Medical History:   Diagnosis Date    Abnormal Pap smear of cervix     Anxiety     Encounter for general adult medical examination without abnormal findings 04/04/2016    Encounter for screening for infections with predominantly sexual mode of transmission     Hypertension     Insulin resistance     Insulin resistance     Insulin resistance     Irregular menses     Labyrinthine disorder     Polycystic ovaries     Sciatica        Family History   Problem Relation Age of Onset    Hypertension Father     Depression Maternal Grandmother     Hypertension Maternal Grandmother     Cancer Maternal Grandfather     Colon cancer Maternal Grandfather     Hypertension Paternal Grandmother     Diabetes Other     Breast cancer Other      Prostate cancer Paternal Grandfather        Social History     Socioeconomic History    Marital status:    Tobacco Use    Smoking status: Never Smoker    Smokeless tobacco: Never Used   Substance and Sexual Activity    Alcohol use: Not Currently    Drug use: Not Currently    Sexual activity: Yes     Partners: Male       Past Surgical History:   Procedure Laterality Date    ADENOIDECTOMY       SECTION      TONSILLECTOMY         Review of Systems       Objective:   There were no vitals taken for this visit.    No image results found.       Physical Exam    LOWER EXTREMITY PHYSICAL EXAMINATION  NEUROLOGY: Sensation to light touch is intact. Proprioception is intact.     DERMATOLOGY:  Skin is supple, dry and intact. No ulcerations are noted. No hyperkeratosis or calluses are noted. No ecchymosis appreciated.  Onychodystrophy noted to the right hallux, 4th, 5th toenails and the left 3rd and 5th toenails.  There is no signs of ingrowing.  The skin to the plantar aspect of the foot is dry and flaky.  There is flakiness present to the interdigital spaces as well.  Slight petechial style lesions are noted to plantar skin    ORTHOPEDIC: Manual Muscle Testing is 5/5 in all planes on the left and right, without pains, with and without resistance.  Right and left. Gait pattern is non-antalgic.    VASCULAR: The right DP pulse is 2/4 and the left DP is 2/4. The right PT pulse is 2/4 and the left PT pulse is 2/4. Proximal to distal, warm to warm. No dependent rubor or elevation palor is noted. Capillary refill time is less than 3 seconds. Hair growth is appreciated to the dorsal foot and digits.    Assessment:     1. Onychodystrophy    2. Tinea pedis of both feet        Plan:     Onychodystrophy  -     KOH PREP (SKIN, HAIR, NAILS)    Tinea pedis of both feet    Other orders  -     ketoconazole (NIZORAL) 2 % cream; Apply topically 2 (two) times daily.  Dispense: 60 g; Refill: 1      Thorough discussion is had  with the patient today, concerning the diagnosis, its etiology, and the treatment algorithm at present.    We discussed the pathology in etiology associated with onychodystrophy discuss opportunities associated with trauma, for nail Polish, trauma to the nail, autoimmune complications, vitamin insufficiencies,etc.  Would recommend KOH sample be taken to assess for possible fungal elements.  Discussed treatment opportunities in regards to anti fungal therapy.  Patient does have recent liver function studies which appeared to be normal.  If fungal is present, would consider Lamisil therapy.     Nail sample was taken and placed in a sterile specimen cup.    Discussed the various treatment options concerning onychomycosis, these being over-the-counter topicals (efficacy is low in regards to cure), prescription strength topicals (better efficacy as compared to OTC variants, but only slightly so, and potentially costly), laser therapy (which is not covered by insurance companies), oral medications (patient is advised that there is a potential, though less than 5%, for the potential of adverse health and side effects; namely liver pathology) and doing nothing (frequent debridements) as onychomycosis is a cosmetic ailment, and has no potential for long-term deleterious effects on the health.    Recommend twice daily applications acute consult to plantar aspect of the foot as this is helpful in reducing signs symptoms associated with tinea pedis.  Continue current powders and sprays in conjunction with applications of medicated cream    Future Appointments   Date Time Provider Department Center   6/14/2022  9:40 AM Dorcas García MD [facility] [place] [facility]   6/2/2023  8:50 AM Dorcas García MD [facility] [place] [facility]

## 2022-06-13 ENCOUNTER — PATIENT MESSAGE (OUTPATIENT)
Dept: PODIATRY | Facility: CLINIC | Age: 32
End: 2022-06-13
Payer: COMMERCIAL

## 2022-06-16 ENCOUNTER — OFFICE VISIT (OUTPATIENT)
Dept: PRIMARY CARE CLINIC | Facility: CLINIC | Age: 32
End: 2022-06-16
Payer: COMMERCIAL

## 2022-06-16 VITALS
SYSTOLIC BLOOD PRESSURE: 126 MMHG | TEMPERATURE: 98 F | BODY MASS INDEX: 41.69 KG/M2 | HEART RATE: 68 BPM | WEIGHT: 227.94 LBS | DIASTOLIC BLOOD PRESSURE: 68 MMHG | RESPIRATION RATE: 18 BRPM

## 2022-06-16 DIAGNOSIS — I10 PRIMARY HYPERTENSION: ICD-10-CM

## 2022-06-16 DIAGNOSIS — F41.9 ANXIETY: ICD-10-CM

## 2022-06-16 DIAGNOSIS — I10 HYPERTENSION, UNSPECIFIED TYPE: ICD-10-CM

## 2022-06-16 DIAGNOSIS — Z86.32 HISTORY OF GESTATIONAL DIABETES: ICD-10-CM

## 2022-06-16 DIAGNOSIS — E66.01 CLASS 3 SEVERE OBESITY WITH SERIOUS COMORBIDITY AND BODY MASS INDEX (BMI) OF 40.0 TO 44.9 IN ADULT, UNSPECIFIED OBESITY TYPE: Chronic | ICD-10-CM

## 2022-06-16 DIAGNOSIS — R76.8 ELEVATED ANTINUCLEAR ANTIBODY (ANA) LEVEL: ICD-10-CM

## 2022-06-16 DIAGNOSIS — E88.819 INSULIN RESISTANCE: Primary | ICD-10-CM

## 2022-06-16 PROBLEM — E66.813 CLASS 3 SEVERE OBESITY WITH SERIOUS COMORBIDITY AND BODY MASS INDEX (BMI) OF 40.0 TO 44.9 IN ADULT: Chronic | Status: ACTIVE | Noted: 2022-06-16

## 2022-06-16 PROBLEM — O24.414 INSULIN CONTROLLED GESTATIONAL DIABETES MELLITUS (GDM) DURING PREGNANCY, ANTEPARTUM: Status: RESOLVED | Noted: 2022-06-16 | Resolved: 2022-06-16

## 2022-06-16 PROBLEM — O24.414 INSULIN CONTROLLED GESTATIONAL DIABETES MELLITUS (GDM) DURING PREGNANCY, ANTEPARTUM: Status: ACTIVE | Noted: 2022-06-16

## 2022-06-16 PROCEDURE — 1160F PR REVIEW ALL MEDS BY PRESCRIBER/CLIN PHARMACIST DOCUMENTED: ICD-10-PCS | Mod: CPTII,S$GLB,, | Performed by: FAMILY MEDICINE

## 2022-06-16 PROCEDURE — 99204 OFFICE O/P NEW MOD 45 MIN: CPT | Mod: S$GLB,,, | Performed by: FAMILY MEDICINE

## 2022-06-16 PROCEDURE — 3074F SYST BP LT 130 MM HG: CPT | Mod: CPTII,S$GLB,, | Performed by: FAMILY MEDICINE

## 2022-06-16 PROCEDURE — 3008F BODY MASS INDEX DOCD: CPT | Mod: CPTII,S$GLB,, | Performed by: FAMILY MEDICINE

## 2022-06-16 PROCEDURE — 1159F MED LIST DOCD IN RCRD: CPT | Mod: CPTII,S$GLB,, | Performed by: FAMILY MEDICINE

## 2022-06-16 PROCEDURE — 3078F PR MOST RECENT DIASTOLIC BLOOD PRESSURE < 80 MM HG: ICD-10-PCS | Mod: CPTII,S$GLB,, | Performed by: FAMILY MEDICINE

## 2022-06-16 PROCEDURE — 1160F RVW MEDS BY RX/DR IN RCRD: CPT | Mod: CPTII,S$GLB,, | Performed by: FAMILY MEDICINE

## 2022-06-16 PROCEDURE — 99204 PR OFFICE/OUTPT VISIT, NEW, LEVL IV, 45-59 MIN: ICD-10-PCS | Mod: S$GLB,,, | Performed by: FAMILY MEDICINE

## 2022-06-16 PROCEDURE — 99999 PR PBB SHADOW E&M-EST. PATIENT-LVL III: ICD-10-PCS | Mod: PBBFAC,,, | Performed by: FAMILY MEDICINE

## 2022-06-16 PROCEDURE — 3074F PR MOST RECENT SYSTOLIC BLOOD PRESSURE < 130 MM HG: ICD-10-PCS | Mod: CPTII,S$GLB,, | Performed by: FAMILY MEDICINE

## 2022-06-16 PROCEDURE — 99999 PR PBB SHADOW E&M-EST. PATIENT-LVL III: CPT | Mod: PBBFAC,,, | Performed by: FAMILY MEDICINE

## 2022-06-16 PROCEDURE — 1159F PR MEDICATION LIST DOCUMENTED IN MEDICAL RECORD: ICD-10-PCS | Mod: CPTII,S$GLB,, | Performed by: FAMILY MEDICINE

## 2022-06-16 PROCEDURE — 3008F PR BODY MASS INDEX (BMI) DOCUMENTED: ICD-10-PCS | Mod: CPTII,S$GLB,, | Performed by: FAMILY MEDICINE

## 2022-06-16 PROCEDURE — 3078F DIAST BP <80 MM HG: CPT | Mod: CPTII,S$GLB,, | Performed by: FAMILY MEDICINE

## 2022-06-16 RX ORDER — METFORMIN HYDROCHLORIDE 500 MG/1
TABLET, EXTENDED RELEASE ORAL
Qty: 60 TABLET | Refills: 3 | Status: SHIPPED | OUTPATIENT
Start: 2022-06-16 | End: 2022-10-17

## 2022-06-16 NOTE — PATIENT INSTRUCTIONS
Please send me brief food log next week  Stay on metformin-- I will increase to 2 pills daily (rx sent in electronically)  I will add a lab order for a Hemoglobin A1c  We may consider ozempic restart if covered by insurance  Please let me know with a mychart update soon.

## 2022-06-16 NOTE — PROGRESS NOTES
Subjective:       Patient ID: Fabiola Viera is a 32 y.o. female.  Pmhx, famhx, soc hx, surg hx reviewed; med/allergies reviewed and d/w pt    Chief Complaint: Weight Loss (PT PRESENTS TO CLINIC FOR WEIGHT LOSS AND DIET PLAN.)  Pt here to discuss metabolic issues. Pt had hx of Gestational DM; was on insulin and had early delivery. Had [place] palsy and pseudotumor. Possible autoimmune issues.   Has appt with Dr Velez at [facility] for rheum. Will also see Dr Olvera at [facility] for PCP.    Hx of gestational DM  With 3 year old; was diet controlled; with this recent pregancy had ift, gestational dm, needed insulin x 1 month. Did have differing while pregnant with elevated in am--highest was 160s. Has been on metformin; had increase recently by Dr García. She will consider ozempic; was on at 2019; put on by [facility]. Saw Dr Davison. At that time   Was on phentermine, metformin, ozempic. This combo worked well and decreased her bmi.     Pt has not seen dietician but has had gen nutrition information given. ([facility] eat fit info given; pt to send food log). No SE's historically from any med. No anxiety increase or insomnia with phentermine. NO hx of bipolar. Hx of controlled HTN. No cp/sob. NO palpitations.       Pt states  has been cooking more at home. Usually gets good dinner. No breakfast/trying to sleep. Eats something for dinner. Sheet pan type meals in air fryer.   Daughter  Is not picky.   Some days enough water; does try.     Med list reviewed: gabapentin for bells palsy; celexa for anxiety  Pain med only as needed  No dry eyes     Did not have postpartum hemorrhage but some bleeding  Baby is doing well.   Pt would like general med principles  Usually does grocery  from [employer].    Saw neurology and optho: working     Had HTN; on nifedipine and labetalol--still on both; down to 2 labetaolol  Just restarted ocp; was okay      Referring MD: Ricky  PCP: will be Dr Olvera  BMI  noted  Diet: variable  Exercise/Activity:  Sleep: sporadic  Stressors: new baby; recent possible autoimmune  Anxiety/Depression Screen/PHQ-2: neg now    HPI  Review of Systems   Constitutional: Negative for activity change, appetite change, fatigue and fever.   Eyes: Negative for visual disturbance.   Respiratory: Negative for apnea, cough, chest tightness and shortness of breath.    Cardiovascular: Negative for chest pain, palpitations and leg swelling.   Gastrointestinal: Negative for abdominal pain, constipation and diarrhea.   Endocrine: Negative for cold intolerance, heat intolerance, polydipsia, polyphagia and polyuria.   Genitourinary: Positive for menstrual problem (recent post partum bleeding; followed by gyn). Negative for frequency.   Musculoskeletal: Positive for arthralgias and myalgias.   Integumentary:  Negative for color change and rash.   Psychiatric/Behavioral: Negative for sleep disturbance and suicidal ideas. The patient is not nervous/anxious.          Objective:      Physical Exam  Vitals and nursing note reviewed.   Constitutional:       General: She is not in acute distress.  HENT:      Head: Normocephalic and atraumatic.   Eyes:      General: No scleral icterus.     Pupils: Pupils are equal, round, and reactive to light.   Neck:      Comments: No TM  Cardiovascular:      Rate and Rhythm: Normal rate and regular rhythm.      Pulses: Normal pulses.      Heart sounds: Normal heart sounds. No murmur heard.    No friction rub. No gallop.   Pulmonary:      Effort: Pulmonary effort is normal. No respiratory distress.      Breath sounds: Normal breath sounds. No wheezing, rhonchi or rales.   Abdominal:      General: Bowel sounds are normal. There is no distension.      Palpations: Abdomen is soft.      Tenderness: There is no abdominal tenderness.   Musculoskeletal:         General: No swelling.      Cervical back: Normal range of motion and neck supple. No tenderness.   Lymphadenopathy:       Cervical: No cervical adenopathy.   Skin:     General: Skin is warm.      Findings: No erythema or rash.   Neurological:      Mental Status: She is alert and oriented to person, place, and time.   Psychiatric:         Mood and Affect: Mood normal.         Behavior: Behavior normal.         Assessment:       1. Insulin resistance    2. Anxiety    3. Hypertension, unspecified type    4. Elevated antinuclear antibody (RAPHAEL) level    5. Class 3 severe obesity with serious comorbidity and body mass index (BMI) of 40.0 to 44.9 in adult, unspecified obesity type    6. History of gestational diabetes               PLAN:    Insulin resistance  -     Hemoglobin A1C; Future; Expected date: 06/23/2022  -     metFORMIN (GLUCOPHAGE-XR) 500 MG ER 24hr tablet; 2 tabs once daily with a meal  Dispense: 60 tablet; Refill: 3    Anxiety  Pt stable on celexa; denies any crisis      Hypertension, unspecified type  -     Hemoglobin A1C; Future; Expected date: 06/23/2022  -     metFORMIN (GLUCOPHAGE-XR) 500 MG ER 24hr tablet; 2 tabs once daily with a meal  Dispense: 60 tablet; Refill: 3  Controlled; continue meds      Elevated antinuclear antibody (RAPHAEL) level  Pt to see Rheum later this summer at  Clinic; will also establish pcp there  Additionally, pt has optho f/u for hx of pseudotumor/papilledema; they have continued to decrease diamox dosage; pt to give me update    Class 3 severe obesity with serious comorbidity and body mass index (BMI) of 40.0 to 44.9 in adult, unspecified obesity type  History of gestational diabetes/known insulin resistance:    Spent approximately 50 min with pt discussing insulin resistance, gestational dm, metabolic syndrome and weight loss in that context.   Pt will increase metformin, send food log next week. Not currently breastfeeding and back on ocp so will see how she does--within next 2 mos  Likely try to see if insurance will pay for ozempic; did well in past.   Pt would like to additionally be on  phentermine--waiting to see if can get off diamox first; bp and anxiety both controlled so no absolute  CI to use.

## 2022-06-17 ENCOUNTER — PATIENT MESSAGE (OUTPATIENT)
Dept: PRIMARY CARE CLINIC | Facility: CLINIC | Age: 32
End: 2022-06-17
Payer: COMMERCIAL

## 2022-06-21 ENCOUNTER — PATIENT MESSAGE (OUTPATIENT)
Dept: PRIMARY CARE CLINIC | Facility: CLINIC | Age: 32
End: 2022-06-21
Payer: COMMERCIAL

## 2022-06-25 ENCOUNTER — OFFICE VISIT (OUTPATIENT)
Dept: FAMILY MEDICINE | Facility: CLINIC | Age: 32
End: 2022-06-25
Payer: COMMERCIAL

## 2022-06-25 DIAGNOSIS — G51.0 FACIAL PARALYSIS ON RIGHT SIDE: Primary | ICD-10-CM

## 2022-06-25 PROCEDURE — 99213 PR OFFICE/OUTPT VISIT, EST, LEVL III, 20-29 MIN: ICD-10-PCS | Mod: 95,,, | Performed by: NURSE PRACTITIONER

## 2022-06-25 PROCEDURE — 99213 OFFICE O/P EST LOW 20 MIN: CPT | Mod: 95,,, | Performed by: NURSE PRACTITIONER

## 2022-06-25 RX ORDER — PREDNISONE 20 MG/1
80 TABLET ORAL DAILY
Qty: 28 TABLET | Refills: 0 | Status: SHIPPED | OUTPATIENT
Start: 2022-06-25 | End: 2022-07-02

## 2022-06-25 RX ORDER — VALACYCLOVIR HYDROCHLORIDE 1 G/1
1000 TABLET, FILM COATED ORAL 3 TIMES DAILY
Qty: 21 TABLET | Refills: 0 | Status: SHIPPED | OUTPATIENT
Start: 2022-06-25 | End: 2022-08-16

## 2022-06-25 NOTE — PATIENT INSTRUCTIONS
If you are worse at any time, go for an in-person evaluation to your PCP office, an URGENT CARE or the Emergency Department.    Call neuro and ent Monday.    You have been evaluated today via a telehealth visit.  While providing good convenience and increased access to care, it is not always possible to fully evaluate a medical problem through type of patient care encounter.  If your symptoms change or worsen or the treatment is not effective, please follow-up with an in-person visit to your primary care center, at an urgent care or the emergency room.  Thank you for choosing [facility]!    Aletha Potter, ZEESHAN, CNP, FNP-BC  [facility]

## 2022-06-25 NOTE — Clinical Note
Pt started yesterday with facial paralysis on the right side after resolution of facial paralysis on the left a few weeks ago.  She will call Monday for f/u appointment(s). ZEESHAN Brannon, CNP, FNP-BC [facility]

## 2022-06-25 NOTE — PROGRESS NOTES
"Fabiola Viera is a 32 y.o. female patient of Dr. Isabel Edmondson MD who presents to the clinic today for a virtual visit.    The patient location is: [place], [place]  The chief complaint leading to consultation is: Bell's palsay    Visit type: audiovisual    Face to Face time with patient: 20 minutes.  22 minutes of total time spent on the encounter, which includes face to face time and non-face to face time preparing to see the patient (eg, review of tests), Obtaining and/or reviewing separately obtained history, Documenting clinical information in the electronic or other health record, Independently interpreting results (not separately reported) and communicating results to the patient/family/caregiver, or Care coordination (not separately reported).         Each patient to whom he or she provides medical services by telemedicine is:  (1) informed of the relationship between the physician and patient and the respective role of any other health care provider with respect to management of the patient; and (2) notified that he or she may decline to receive medical services by telemedicine and may withdraw from such care at any time.      HPI    Patient, who is not known to me, reports a new problem to me: facial droop on the right side now.  Had this on the left side while in the hospital delivering her child about 4 weeks.  Lasted a few weeks.  Started yesterday.  Not much worse since yesterday. Also feels a "cold sore" like sensation in the back of her throat on the right.  Tolerated the high dose steroids last time.  Not nearly as painful this time.    .    Answers for HPI/ROS submitted by the patient on 6/25/2022  Affected ear: right  Chronicity: recurrent  Onset: yesterday  Progression since onset: unchanged  Frequency: every few hours  Fever: no fever  Fever duration: less than 1 day  Pain - numeric: 5/10  abdominal pain: No  ear discharge: No  rash: No  cough: No  headaches: No  rhinorrhea: " No  diarrhea: No  hearing loss: No  sore throat: No  neck pain: No  vomiting: No  drainage: No  Treatments tried: NSAIDs, acetaminophen  Improvement on treatment: moderate  Pain severity: moderate  chronic ear infection: No  hearing loss: No  tympanostomy tube: No        These symptoms began 1 day  ago and status is slightly worse..     Pt denies the following symptoms:  Fever, feeling ill, right-sided extremity weakness.    Aggravating factors include none known. .    Relieving factors include none .    OTC Medications tried are analgesics for pain.    Prescription medications taken for symptoms are none.    Pertinent medical history:  H/o same on the opposite side 8 weeks ago.  Saw ENT and Neuro..    ROS    Constitutional:  No fever    Head:    No headache  Ears:    Did start like the last time with ear pain but this time on the right  Eyes:    No discharge, is able to fully close her eye at this time.  Nose:    No sxs.  Throat:  feels ST pain in the back of the throat like a cold sore, cannot see it.    Lungs:   No difficulty breathing, no coughing    MS:  + change in bones, joints or muscles--muscles on the right side of the face..    Skin:  No rashes.      Past Medical History:   Diagnosis Date    Abnormal Pap smear of cervix     Anxiety     Encounter for general adult medical examination without abnormal findings 04/04/2016    Encounter for screening for infections with predominantly sexual mode of transmission     Hypertension     Insulin resistance     Insulin resistance     Insulin resistance     Irregular menses     Labyrinthine disorder     Polycystic ovaries     Sciatica        Current Outpatient Medications:     citalopram (CELEXA) 20 MG tablet, Take 1 tablet (20 mg total) by mouth once daily., Disp: 30 tablet, Rfl: 11    gabapentin (NEURONTIN) 300 MG capsule, Take 1 capsule (300 mg total) by mouth 3 (three) times daily., Disp: 90 capsule, Rfl: 11    hydroCHLOROthiazide (HYDRODIURIL) 25  MG tablet, TAKE 1/2 TABLET(12.5 MG) BY MOUTH EVERY DAY, Disp: 45 tablet, Rfl: 0    ketoconazole (NIZORAL) 2 % cream, Apply topically 2 (two) times daily., Disp: 60 g, Rfl: 1    L. reuteri/L. rhamnosus (REPHRESH PRO-B ORAL), Take 1 each by mouth once daily., Disp: , Rfl:     labetaloL (NORMODYNE) 200 MG tablet, Take 1 tablet (200 mg total) by mouth every 12 (twelve) hours., Disp: 60 tablet, Rfl: 3    metFORMIN (GLUCOPHAGE-XR) 500 MG ER 24hr tablet, 2 tabs once daily with a meal, Disp: 60 tablet, Rfl: 3    NIFEdipine (PROCARDIA-XL) 60 MG (OSM) 24 hr tablet, Take 1 tablet (60 mg total) by mouth 2 (two) times a day., Disp: 60 tablet, Rfl: 3    norethindrone-ethinyl estradiol (JUNEL FE 1/20) 1 mg-20 mcg (21)/75 mg (7) per tablet, Take 1 tablet by mouth once daily., Disp: 30 tablet, Rfl: 11    Patient has never been a smoker.    PHYSICAL EXAM    Alert, coop 32 y.o. female patient in no acute distress, not ill appearing.    There were no vitals filed for this visit.  .      CC, patient questionnaire (if available), medications & PMH all reviewed today.    Head:  Normocephalic, atraumatic.    EENT:  External ears symmetrically placed, no lesions noted.               Eye lids normal, no discharge present.  Conjunctiva not injected.               No edema or erythema of the external nose.               Draws air through nares easily.               No sneezing during the appointment.     No Cervical lymph nodes palp and tender on patient self exam.    Resp:  Respirations even, unlabored   No accessory muscle use noted.              Talks in full sentences.              No coughing during the interview.    CV:   No circumoral cyanosis    MS:   Head and upper extremity movements symmetrical and smooth.           Right side of the face moves < left--can lift eyebrow a little, can lightly close right eye.    NEURO:  Alert and oriented x 4.  Responds appropriately during interaction.                  Speech fairly clear.   Obvious droop on the right side of the face.    Skin:  Warm, dry, color good.    Psych:  Responds appropriately throughout the visit.               Relaxed.  Well-groomed.               Thought processes intact and judgement good.               Behavior and affect appropriate for the situation and setting.    Facial paralysis on right side  Comments:  reccurent Bell's Palsy vs other etiology  Orders:  -     predniSONE (DELTASONE) 20 MG tablet; Take 4 tablets (80 mg total) by mouth once daily. for 7 days  Dispense: 28 tablet; Refill: 0  -     valACYclovir (VALTREX) 1000 MG tablet; Take 1 tablet (1,000 mg total) by mouth 3 (three) times daily. for 7 days  Dispense: 21 tablet; Refill: 0        Pt today presents with right sided facial droop that started with soreness in the ear then spread across the face.  Not much worse than yesterday. still can fully close her right eye but not as tightly as the left.. .  Additionally, had the same problem on the left about 8 weeks ago when in the hospital delivering her baby.      Referred to ENT and Neuro .    Pt advised to perform comfort measures recommended on patient instruction sheet, which were reviewed at the time of the visit..    If worse or concerns, the patient is advised to call for advise to this office or the PCP office or call [facility] or go to the nearest URGENT CARE or ER.  Explained exam findings, diagnosis and treatment plan to patient.  Questions answered and patient states understanding.

## 2022-06-27 ENCOUNTER — TELEPHONE (OUTPATIENT)
Dept: NEUROLOGY | Facility: CLINIC | Age: 32
End: 2022-06-27
Payer: COMMERCIAL

## 2022-06-27 ENCOUNTER — OFFICE VISIT (OUTPATIENT)
Dept: NEUROLOGY | Facility: CLINIC | Age: 32
End: 2022-06-27
Payer: COMMERCIAL

## 2022-06-27 VITALS
OXYGEN SATURATION: 98 % | DIASTOLIC BLOOD PRESSURE: 84 MMHG | BODY MASS INDEX: 42.47 KG/M2 | SYSTOLIC BLOOD PRESSURE: 128 MMHG | HEIGHT: 62 IN | RESPIRATION RATE: 16 BRPM | HEART RATE: 90 BPM | WEIGHT: 230.81 LBS

## 2022-06-27 DIAGNOSIS — G51.0 LEFT-SIDED BELL'S PALSY: ICD-10-CM

## 2022-06-27 DIAGNOSIS — E88.819 INSULIN RESISTANCE: ICD-10-CM

## 2022-06-27 DIAGNOSIS — R76.8 ANA POSITIVE: ICD-10-CM

## 2022-06-27 DIAGNOSIS — G51.0 BELL PALSY: ICD-10-CM

## 2022-06-27 DIAGNOSIS — E66.01 CLASS 3 SEVERE OBESITY WITH SERIOUS COMORBIDITY AND BODY MASS INDEX (BMI) OF 40.0 TO 44.9 IN ADULT, UNSPECIFIED OBESITY TYPE: Chronic | ICD-10-CM

## 2022-06-27 DIAGNOSIS — I10 PRIMARY HYPERTENSION: ICD-10-CM

## 2022-06-27 DIAGNOSIS — F41.9 ANXIETY: ICD-10-CM

## 2022-06-27 DIAGNOSIS — G51.0 RIGHT-SIDED BELL'S PALSY: Primary | ICD-10-CM

## 2022-06-27 PROCEDURE — 3079F DIAST BP 80-89 MM HG: CPT | Mod: CPTII,S$GLB,, | Performed by: PSYCHIATRY & NEUROLOGY

## 2022-06-27 PROCEDURE — 3074F PR MOST RECENT SYSTOLIC BLOOD PRESSURE < 130 MM HG: ICD-10-PCS | Mod: CPTII,S$GLB,, | Performed by: PSYCHIATRY & NEUROLOGY

## 2022-06-27 PROCEDURE — 3008F PR BODY MASS INDEX (BMI) DOCUMENTED: ICD-10-PCS | Mod: CPTII,S$GLB,, | Performed by: PSYCHIATRY & NEUROLOGY

## 2022-06-27 PROCEDURE — 1159F PR MEDICATION LIST DOCUMENTED IN MEDICAL RECORD: ICD-10-PCS | Mod: CPTII,S$GLB,, | Performed by: PSYCHIATRY & NEUROLOGY

## 2022-06-27 PROCEDURE — 3008F BODY MASS INDEX DOCD: CPT | Mod: CPTII,S$GLB,, | Performed by: PSYCHIATRY & NEUROLOGY

## 2022-06-27 PROCEDURE — 99999 PR PBB SHADOW E&M-EST. PATIENT-LVL V: CPT | Mod: PBBFAC,,, | Performed by: PSYCHIATRY & NEUROLOGY

## 2022-06-27 PROCEDURE — 99999 PR PBB SHADOW E&M-EST. PATIENT-LVL V: ICD-10-PCS | Mod: PBBFAC,,, | Performed by: PSYCHIATRY & NEUROLOGY

## 2022-06-27 PROCEDURE — 99215 PR OFFICE/OUTPT VISIT, EST, LEVL V, 40-54 MIN: ICD-10-PCS | Mod: S$GLB,,, | Performed by: PSYCHIATRY & NEUROLOGY

## 2022-06-27 PROCEDURE — 1159F MED LIST DOCD IN RCRD: CPT | Mod: CPTII,S$GLB,, | Performed by: PSYCHIATRY & NEUROLOGY

## 2022-06-27 PROCEDURE — 99215 OFFICE O/P EST HI 40 MIN: CPT | Mod: S$GLB,,, | Performed by: PSYCHIATRY & NEUROLOGY

## 2022-06-27 PROCEDURE — 3079F PR MOST RECENT DIASTOLIC BLOOD PRESSURE 80-89 MM HG: ICD-10-PCS | Mod: CPTII,S$GLB,, | Performed by: PSYCHIATRY & NEUROLOGY

## 2022-06-27 PROCEDURE — 3074F SYST BP LT 130 MM HG: CPT | Mod: CPTII,S$GLB,, | Performed by: PSYCHIATRY & NEUROLOGY

## 2022-06-27 RX ORDER — DIAZEPAM 5 MG/1
TABLET ORAL
Qty: 1 TABLET | Refills: 0 | Status: SHIPPED | OUTPATIENT
Start: 2022-06-27 | End: 2022-10-10 | Stop reason: ALTCHOICE

## 2022-06-27 NOTE — PROGRESS NOTES
"Subjective:       Patient ID: Fabiola Viera is a 32 y.o. female.    Chief Complaint: New Facial paralysis          HPI     BACKGROUND HISTORY       The patient developed  facial drooping in the LT side on 2022  (1 day after ). About 2 days prior to the facial weakness she developed neck pain. The drooping involved the whole side of the face. The patient also admits to distorted taste sensation and decreased tearing from the eye. The patient is having difficulty closing the LT eye. The patient describes that noise is becoming very loud and painful perceived by the ear. The patient denies trauma. No double vision. No hearing loss. No trouble breathing or swallowing. No slurring of speech. No arm or leg weakness or numbness. No severe headache. No history of tick bites. No history of joint pain or skin rash. No lymph node enlargement. The patient did not notice vesicular lesions in the ear or face. No dizziness. No balance problems. CTH was NL. Was properly treated with Steroids and Valtrex. She is on GBP titration to 300 mg TID. On 2022 NCS/EMG confirmed left facial  Mononeuropathy (the preservation and relative symmetry of the facial motor amplitudes implies a predominantly demyelinating lesion with a better prognosis).Checked HIV, RPR, ESR, CRP, RAPHAEL, RF, ACE and Lyme to rule out secondary causes of Bell Palsy. Recommended LT Eye patching at bed time, LT Ear plugging.The patient was advised that the chance of good outcome is about 85-90% and the risk of recurrence is about 15%.Continued  mg TID titration. On 2022 Labs showed RAPHAEL +ve with elevated ESR and CRP.RF, HIV, Lyme, ACE were -ve. Referred her to Rheumatology for evaluation.     In , while pregnant, she underwent eye examination that showed "papilledema". Underwent Brain MRIs that were NL. Was diagnosed with IIH (PTC) despite being completely asymptomatic with no headache and no visual changes. The patient has " been on Diamox 500 mg BID. Recommended stopping Diamox, Dilated ophthalmology examination 2 weeks after stopping Diamox in addition to FL LP 2 weeks after stopping Diamox to measure OP.      INTERVAL HISTORY     Has not undergone rheumatological evaluation yet. The LT facial weakness improved dramatically to barley noticeable. On 06-, she woke up with the RT side of her face paralyzed (2 months from LT side paralysis). No facial swelling. No tongue fissuring.  She is on Prednisone and Valtrex.     Recent eye examinations off Diamox showed no papilledema. Has not undergone LP yet.       Review of Systems   Constitutional: Negative for appetite change and fatigue.   HENT: Negative for hearing loss and tinnitus.    Eyes: Negative for photophobia and visual disturbance.   Respiratory: Negative for apnea and shortness of breath.    Cardiovascular: Negative for chest pain and palpitations.   Gastrointestinal: Negative for nausea and vomiting.   Endocrine: Negative for cold intolerance and heat intolerance.   Genitourinary: Negative for difficulty urinating and urgency.   Musculoskeletal: Negative for arthralgias, back pain, gait problem, joint swelling, myalgias, neck pain and neck stiffness.   Skin: Negative for color change and rash.   Allergic/Immunologic: Negative for environmental allergies and immunocompromised state.   Neurological: Positive for facial asymmetry. Negative for dizziness, tremors, seizures, syncope, speech difficulty, weakness, light-headedness, numbness and headaches.   Hematological: Negative for adenopathy. Does not bruise/bleed easily.   Psychiatric/Behavioral: Negative for agitation, behavioral problems, confusion, decreased concentration, dysphoric mood, hallucinations, self-injury, sleep disturbance and suicidal ideas. The patient is not hyperactive.                  Current Outpatient Medications:     citalopram (CELEXA) 20 MG tablet, Take 1 tablet (20 mg total) by mouth once daily.,  Disp: 30 tablet, Rfl: 11    gabapentin (NEURONTIN) 300 MG capsule, Take 1 capsule (300 mg total) by mouth 3 (three) times daily., Disp: 90 capsule, Rfl: 11    hydroCHLOROthiazide (HYDRODIURIL) 25 MG tablet, TAKE 1/2 TABLET(12.5 MG) BY MOUTH EVERY DAY, Disp: 45 tablet, Rfl: 0    ketoconazole (NIZORAL) 2 % cream, Apply topically 2 (two) times daily., Disp: 60 g, Rfl: 1    L. reuteri/L. rhamnosus (REPHRESH PRO-B ORAL), Take 1 each by mouth once daily., Disp: , Rfl:     labetaloL (NORMODYNE) 200 MG tablet, Take 1 tablet (200 mg total) by mouth every 12 (twelve) hours., Disp: 60 tablet, Rfl: 3    metFORMIN (GLUCOPHAGE-XR) 500 MG ER 24hr tablet, 2 tabs once daily with a meal, Disp: 60 tablet, Rfl: 3    NIFEdipine (PROCARDIA-XL) 60 MG (OSM) 24 hr tablet, Take 1 tablet (60 mg total) by mouth 2 (two) times a day., Disp: 60 tablet, Rfl: 3    norethindrone-ethinyl estradiol (JUNEL FE ) 1 mg-20 mcg (21)/75 mg (7) per tablet, Take 1 tablet by mouth once daily., Disp: 30 tablet, Rfl: 11    predniSONE (DELTASONE) 20 MG tablet, Take 4 tablets (80 mg total) by mouth once daily. for 7 days, Disp: 28 tablet, Rfl: 0    valACYclovir (VALTREX) 1000 MG tablet, Take 1 tablet (1,000 mg total) by mouth 3 (three) times daily. for 7 days, Disp: 21 tablet, Rfl: 0    diazePAM (VALIUM) 5 MG tablet, 1 tab 30 minutes before MRI, Disp: 1 tablet, Rfl: 0  Past Medical History:   Diagnosis Date    Abnormal Pap smear of cervix     Anxiety     Encounter for general adult medical examination without abnormal findings 2016    Encounter for screening for infections with predominantly sexual mode of transmission     Hypertension     Insulin resistance     Insulin resistance     Insulin resistance     Irregular menses     Labyrinthine disorder     Polycystic ovaries     Sciatica      Past Surgical History:   Procedure Laterality Date    ADENOIDECTOMY       SECTION      TONSILLECTOMY       Social History     Socioeconomic History    Marital  status:    Tobacco Use    Smoking status: Never Smoker    Smokeless tobacco: Never Used   Substance and Sexual Activity    Alcohol use: Not Currently    Drug use: Never    Sexual activity: Yes     Partners: Male     Comment: Birth control             Past/Current Medical/Surgical History, Past/Current Social History, Past/Current Family History and Past/Current Medications were reviewed in detail.        Objective:           VITAL SIGNS WERE REVIEWED      GENERAL APPEARANCE:     The patient looks comfortable.    BMI 42.22    No signs of respiratory distress.    Normal breathing pattern.    No dysmorphic features    Normal eye contact.     GENERAL MEDICAL EXAM:    HEENT:  Head is atraumatic normocephalic. Fundoscopic (Ophthalmoscopic) exam showed no disc edema.      Neck and Axillae: No JVD. No visible lesions.    Cardiopulmonary: No cyanosis. No tachypnea. Normal respiratory effort.    Gastrointestinal/Urogenital:  No jaundice. No stomas or lesions. No visible hernias. No catheters.     Skin, Hair and Nails: No pathognonomic skin rash. No neurofibromatosis. No visible lesions.No stigmata of autoimmune disease. No clubbing.    Limbs: No varicose veins. No visible swelling.    Muskoskeletal: No visible deformities.No visible lesions.           Neurologic Exam     Mental Status   Oriented to person, place, and time.   Follows 3 step commands.   Attention: normal. Concentration: normal.   Speech: speech is normal   Level of consciousness: alert  Able to name object. Able to repeat. Normal comprehension.     Cranial Nerves     CN II   Visual fields full to confrontation.   Visual acuity: normal  Right visual field deficit: none  Left visual field deficit: none     CN III, IV, VI   Pupils are equal, round, and reactive to light.  Extraocular motions are normal.   Right pupil: Size: 2 mm. Shape: regular. Reactivity: brisk. Consensual response: intact. Accommodation: intact.   Left pupil: Size: 2 mm. Shape: regular.  Reactivity: brisk. Consensual response: intact. Accommodation: intact.   CN III: no CN III palsy  CN VI: no CN VI palsy  Nystagmus: none   Diplopia: none  Ophthalmoparesis: none  Upgaze: normal  Downgaze: normal  Conjugate gaze: present  Vestibulo-ocular reflex: present    CN V   Facial sensation intact.   Right facial sensation deficit: none  Left facial sensation deficit: none  Jaw jerk: normal    CN VII   Right facial weakness: peripheral  Left facial weakness: none    CN VIII   CN VIII normal.   Hearing: intact    CN IX, X   CN IX normal.   CN X normal.   Palate: symmetric    CN XI   CN XI normal.   Right sternocleidomastoid strength: normal  Left sternocleidomastoid strength: normal  Right trapezius strength: normal  Left trapezius strength: normal    CN XII   CN XII normal.   Tongue: not atrophic  Fasciculations: absent  Tongue deviation: none    Motor Exam   Muscle bulk: normal  Overall muscle tone: normal  Right arm tone: normal  Left arm tone: normal  Right arm pronator drift: absent  Left arm pronator drift: absent  Right leg tone: normal  Left leg tone: normal    Strength   Strength 5/5 throughout.   Right neck flexion: 5/5  Left neck flexion: 5/5  Right neck extension: 5/5  Left neck extension: 5/5  Right deltoid: 5/5  Left deltoid: 5/5  Right biceps: 5/5  Left biceps: 5/5  Right triceps: 5/5  Left triceps: 5/5  Right wrist flexion: 5/5  Left wrist flexion: 5/5  Right wrist extension: 5/5  Left wrist extension: 5/5  Right interossei: 5/5  Left interossei: 5/5  Right iliopsoas: 5/5  Left iliopsoas: 5/5  Right quadriceps: 5/5  Left quadriceps: 5/5  Right hamstrin/5  Left hamstrin/5  Right glutei: 5/5  Left glutei: 5/5  Right anterior tibial: 5/5  Left anterior tibial: 5/5  Right posterior tibial: 5/5  Left posterior tibial: 5/5  Right peroneal: 5/5  Left peroneal: 5/5  Right gastroc: 5/5  Left gastroc: 5/5    Sensory Exam   Light touch normal.   Right arm light touch: normal  Left arm light touch:  normal  Right leg light touch: normal  Left leg light touch: normal  Vibration normal.   Right arm vibration: normal  Left arm vibration: normal  Right leg vibration: normal  Left leg vibration: normal  Proprioception normal.   Right arm proprioception: normal  Left arm proprioception: normal  Right leg proprioception: normal  Left leg proprioception: normal  Pinprick normal.   Right arm pinprick: normal  Left arm pinprick: normal  Right leg pinprick: normal  Left leg pinprick: normal  Graphesthesia: normal  Stereognosis: normal    Gait, Coordination, and Reflexes     Gait  Gait: normal    Coordination   Romberg: negative  Finger to nose coordination: normal  Heel to shin coordination: normal  Tandem walking coordination: normal    Tremor   Resting tremor: absent  Intention tremor: absent  Action tremor: absent    Reflexes   Right brachioradialis: 2+  Left brachioradialis: 2+  Right biceps: 2+  Left biceps: 2+  Right triceps: 2+  Left triceps: 2+  Right patellar: 2+  Left patellar: 2+  Right achilles: 2+  Left achilles: 2+  Right plantar: normal  Left plantar: normal  Right Michel: absent  Left Michel: absent  Right ankle clonus: absent  Left ankle clonus: absent  Right pendular knee jerk: absent  Left pendular knee jerk: absent      Lab Results   Component Value Date    WBC 7.27 04/20/2022    HGB 11.5 (A) 04/20/2022    HCT 36.3 (A) 04/20/2022    MCV 85.6 04/20/2022     04/20/2022     BUN   Date Value Ref Range Status   04/20/2022 11 5 - 17 mg/dL Final     Creatinine   Date Value Ref Range Status   04/20/2022 0.6 0.5 - 1.0 mg/dl Final     AST   Date Value Ref Range Status   04/20/2022 20 10 - 50 U/L Final     ALT   Date Value Ref Range Status   04/20/2022 15 <35 U/L Final     Comment:     **NOTE: Effective 1/22/20: ALT result represents a new  improved methodology. The Normal Range has changed  accordingly and previous ALT patient results should  not be compared to the current result.          07-    CSF Cx -ve, Protein CSF NL, VDRL CSF -ve,  Crypto CSF,   CSF Lymph 85^ elevate NL variants WBC 9 ^ Glucose CSF 87 ^  Normal variants         Brain MRIs reported as NL     04-    CTH NL reported as NL      04-    NCS/EMG left facial  Mononeuropathy (the preservation and relative symmetry of the facial motor amplitudes implies a predominantly demyelinating lesion with a better prognosis).    05-    Labs    RAPHAEL +ve with elevated ESR and CRP.    RF, HIV, Lyme, ACE -ve      07-    Brain MRI WWO    B/L Facial nerves enhancement ( Bell Palsy). No masses. No brain or brain stem abnormalities. No meningeal enhancement.      Overall unremarkable MR appearance of the brain and IAC's.    07-    NCS/EMG Face     There is electrophysiologic suggestive of right facial mononeuropathy.       The preservation with marked asymmetry of the facial motor amplitudes implies significant axonal loss, and a more prolonged recovery.      11-    Ophthalmology examination showed normal discs with no papilledema.     Reviewed the neuroimaging independently       Assessment:       1. Right-sided Bell's palsy    2. Class 3 severe obesity with serious comorbidity and body mass index (BMI) of 40.0 to 44.9 in adult, unspecified obesity type    3. Insulin resistance    4. Anxiety    5. Bell palsy    6. Primary hypertension    7. Left-sided Bell's palsy    8. RAPHAEL positive        Plan:       LOWER MOTOR NEURON (LMN)  (PERIPHERAL)  LT FACIAL PARESIS (BELL PALSY) S/P STEROIDS AND VALTREX 04-     LOWER MOTOR NEURON (LMN)  (PERIPHERAL)  RT FACIAL PARESIS (BELL PALSY) ON STEROIDS AND VALTREX 06-     RECURRENCE WITHIN 2 MONTHS IS ATYPICAL AND WARRANTS FURTHER INVESTIGATION.     INCONSISTENT WITH MELKERSSON-EMMETT SYNDROME.          Brain MRI WWO.     Rheumatology evaluation    NCS/EMG in 2 weeks.     RT Eye patching at bed time.    RT Ear plugging.        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    Dilated ophthalmology examination serially.    Proceed with FL LP to measure OP.      MEDICAL/SURGICAL COMORBIDITIES     All relevant medical comorbidities noted and managed by primary care physician and medical care team.          HEALTHY LIFESTYLE AND PREVENTATIVE CARE    The patient to adhere to the age-appropriate health maintenance guidelines including screening tests and vaccinations. The patient to adhere to  healthy lifestyle, optimal weight, exercise, healthy diet, good sleep hygiene and avoiding drugs including smoking, alcohol and recreational drugs.      RTC with results             Piedad Munson MD, FAAN    Attending Neurologist/Epileptologist         Diplomate, American Board of Psychiatry and Neurology    Diplomate, American Board of Clinical Neurophysiology     Fellow, American Academy of Neurology         I spent a total of 49 minutes on the day of the visit.  This includes face to face time and non-face to face time preparing to see the patient (eg, review of tests), obtaining and/or reviewing separately obtained history, documenting clinical information in the electronic or other health record, independently interpreting results and communicating results to the patient/family/caregiver, or care coordinator.

## 2022-06-27 NOTE — TELEPHONE ENCOUNTER
----- Message from Piedad Munson MD sent at 6/25/2022  2:47 PM CDT -----    ----- Message -----  From: Char Potter NP  Sent: 6/25/2022  12:33 PM CDT  To: Piedad Munson MD, Grover Kidd MD    Pt started yesterday with facial paralysis on the right side after resolution of facial paralysis on the left a few weeks ago.  She will call Monday for f/u appointment(s).  Aletha Potter, ZEESHAN, CNP, FNP-BC  [facility]

## 2022-07-01 DIAGNOSIS — H47.10 PAPILLEDEMA: Primary | ICD-10-CM

## 2022-07-01 DIAGNOSIS — G51.0 RIGHT-SIDED BELL'S PALSY: ICD-10-CM

## 2022-07-01 DIAGNOSIS — G51.0 LEFT-SIDED BELL'S PALSY: ICD-10-CM

## 2022-07-01 DIAGNOSIS — R76.8 ANA POSITIVE: ICD-10-CM

## 2022-07-05 ENCOUNTER — PATIENT MESSAGE (OUTPATIENT)
Dept: PRIMARY CARE CLINIC | Facility: CLINIC | Age: 32
End: 2022-07-05
Payer: COMMERCIAL

## 2022-07-11 ENCOUNTER — TELEPHONE (OUTPATIENT)
Dept: NEUROLOGY | Facility: CLINIC | Age: 32
End: 2022-07-11
Payer: COMMERCIAL

## 2022-07-11 ENCOUNTER — HOSPITAL ENCOUNTER (OUTPATIENT)
Dept: RADIOLOGY | Facility: HOSPITAL | Age: 32
Discharge: HOME | End: 2022-07-11
Attending: PSYCHIATRY & NEUROLOGY
Payer: COMMERCIAL

## 2022-07-11 ENCOUNTER — PATIENT MESSAGE (OUTPATIENT)
Dept: NEUROLOGY | Facility: CLINIC | Age: 32
End: 2022-07-11
Payer: COMMERCIAL

## 2022-07-11 DIAGNOSIS — G51.0 LEFT-SIDED BELL'S PALSY: ICD-10-CM

## 2022-07-11 DIAGNOSIS — G51.0 BELL PALSY: ICD-10-CM

## 2022-07-11 DIAGNOSIS — G51.0 RIGHT-SIDED BELL'S PALSY: ICD-10-CM

## 2022-07-11 PROCEDURE — 25500020 PHARM REV CODE 255: Mod: PN | Performed by: PSYCHIATRY & NEUROLOGY

## 2022-07-11 PROCEDURE — A9585 GADOBUTROL INJECTION: HCPCS | Mod: PN | Performed by: PSYCHIATRY & NEUROLOGY

## 2022-07-11 PROCEDURE — 70553 MRI BRAIN STEM W/O & W/DYE: CPT | Mod: TC,PN

## 2022-07-11 RX ORDER — GADOBUTROL 604.72 MG/ML
10 INJECTION INTRAVENOUS
Status: COMPLETED | OUTPATIENT
Start: 2022-07-11 | End: 2022-07-11

## 2022-07-11 RX ADMIN — GADOBUTROL 10 ML: 604.72 INJECTION INTRAVENOUS at 10:07

## 2022-07-11 NOTE — TELEPHONE ENCOUNTER
----- Message from Piedad Munson MD sent at 7/11/2022 11:35 AM CDT -----  07-    Brain MRI WWO    B/L Facial nerves enhancement ( Bell Palsy). No masses. No brain or brain stem abnormalities. No meningeal enhancement.      Overall unremarkable MR appearance of the brain and IAC's.

## 2022-07-11 NOTE — H&P (VIEW-ONLY)
07-    Brain MRI WWO    B/L Facial nerves enhancement ( Bell Palsy). No masses. No brain or brain stem abnormalities. No meningeal enhancement.      Overall unremarkable MR appearance of the brain and IAC's.

## 2022-07-11 NOTE — TELEPHONE ENCOUNTER
----- Message from Erica Dumas sent at 7/11/2022 12:16 PM CDT -----  Contact: Fabiola  Type:  Patient Returning Call    Who Called: Fabiola  Who Left Message for Patient: MERLE  Does the patient know what this is regarding?: She stated that she is not sure and a message was left for to call the office   Would the patient rather a call back or a response via [facility]? Call back   Best Call Back Number: Please call her at [phone]  Additional Information:

## 2022-07-12 ENCOUNTER — TELEPHONE (OUTPATIENT)
Dept: NEUROLOGY | Facility: CLINIC | Age: 32
End: 2022-07-12

## 2022-07-12 ENCOUNTER — PATIENT MESSAGE (OUTPATIENT)
Dept: NEUROLOGY | Facility: CLINIC | Age: 32
End: 2022-07-12

## 2022-07-12 ENCOUNTER — PROCEDURE VISIT (OUTPATIENT)
Dept: NEUROLOGY | Facility: CLINIC | Age: 32
End: 2022-07-12
Payer: COMMERCIAL

## 2022-07-12 DIAGNOSIS — G51.0 RIGHT-SIDED BELL'S PALSY: ICD-10-CM

## 2022-07-12 PROCEDURE — 95907 PR NERVE CONDUCTION STUDY; 1-2 STUDIES: ICD-10-PCS | Mod: S$GLB,,, | Performed by: PSYCHIATRY & NEUROLOGY

## 2022-07-12 PROCEDURE — 95907 NVR CNDJ TST 1-2 STUDIES: CPT | Mod: S$GLB,,, | Performed by: PSYCHIATRY & NEUROLOGY

## 2022-07-12 NOTE — PROCEDURES
[facility]   Ariel Gunn  Department of Neurology  [address] [place] [address] [place]  [address]  Phone [phone]     Fax  [phone]        Patient: Maximiliano Hicks YOB: 1990  Patient ID: [id number] Age: 32 Years 1 Months  Sex: Female Ref. Provider: Piedad Munson MD  Notes: NCS/Face/RT-sided Bell's palsy. C/O: On 6/25/22, woke up with right side of face paralyzed (2 months from onset of left side paralysis). Currently on prednisone and Valtrex. See NCS from 4/28/22.      SUMMARY     Right facial motor study recording the nasalis and orbicularis showed a reduced amplitude compared to the right side and absent response recording the orbicularis oris.      IMPRESSION      There is electrophysiologic suggestive of right facial mononeuropathy.       The preservation with marked asymmetry of the facial motor amplitudes implies significant axonal loss, and a more prolonged recovery.      ---------------------------------                          Piedad Munson M.D., F.A.A.N.        Diplomate, American Board of Psychiatry and Neurology  Diplomate, American Board of Clinical Neurophysiology   Fellow, American Academy of Neurology       Motor NCS      Nerve / Sites Rec. Site Lat Amp Dist     ms mV cm   L FACIAL - Nasalis      Mastoid Nasalis 3.18 1.4 12   R FACIAL - Nasalis      Mastoid Nasalis 5.83 0.3 12   L FACIAL - OrbOculi      Mastoid OrbOculi 2.55 1.3 10   R FACIAL - OrbOculi      Mastoid OrbOculi 3.33 0.1 9   L FACIAL - OrbOris      Mastoid OrbOris 3.39 4.5 9   R FACIAL - OrbOris      Mastoid OrbOris NR NR 8.5

## 2022-07-12 NOTE — TELEPHONE ENCOUNTER
----- Message from Piedad Munson MD sent at 7/12/2022  1:18 PM CDT -----  07-    NCS/EMG Face     There is electrophysiologic suggestive of right facial mononeuropathy.       The preservation with marked asymmetry of the facial motor amplitudes implies significant axonal loss, and a more prolonged recovery.

## 2022-07-15 ENCOUNTER — HOSPITAL ENCOUNTER (OUTPATIENT)
Dept: RADIOLOGY | Facility: HOSPITAL | Age: 32
Discharge: HOME | End: 2022-07-15
Attending: PSYCHIATRY & NEUROLOGY
Payer: COMMERCIAL

## 2022-07-15 DIAGNOSIS — H47.10 PAPILLEDEMA: ICD-10-CM

## 2022-07-15 LAB
B-HCG UR QL: NEGATIVE
CLARITY CSF: CLEAR
COLOR CSF: COLORLESS
CTP QC/QA: YES
GLUCOSE CSF-MCNC: 87 MG/DL (ref 40–70)
LYMPHOCYTES NFR CSF MANUAL: 85 % (ref 40–80)
MONOS+MACROS NFR CSF MANUAL: 15 % (ref 15–45)
PROT CSF-MCNC: 36 MG/DL (ref 15–40)
RBC # CSF: 13 /CU MM
SPECIMEN VOL CSF: 4 ML
WBC # CSF: 9 /CU MM (ref 0–5)

## 2022-07-15 PROCEDURE — 62328 DX LMBR SPI PNXR W/FLUOR/CT: CPT

## 2022-07-15 PROCEDURE — 86592 SYPHILIS TEST NON-TREP QUAL: CPT | Performed by: PSYCHIATRY & NEUROLOGY

## 2022-07-15 PROCEDURE — 89051 BODY FLUID CELL COUNT: CPT | Performed by: PSYCHIATRY & NEUROLOGY

## 2022-07-15 PROCEDURE — 82945 GLUCOSE OTHER FLUID: CPT | Performed by: PSYCHIATRY & NEUROLOGY

## 2022-07-15 PROCEDURE — 81025 URINE PREGNANCY TEST: CPT | Performed by: RADIOLOGY

## 2022-07-15 PROCEDURE — 87205 SMEAR GRAM STAIN: CPT | Performed by: PSYCHIATRY & NEUROLOGY

## 2022-07-15 PROCEDURE — 86403 PARTICLE AGGLUT ANTBDY SCRN: CPT | Performed by: PSYCHIATRY & NEUROLOGY

## 2022-07-15 PROCEDURE — 87070 CULTURE OTHR SPECIMN AEROBIC: CPT | Performed by: PSYCHIATRY & NEUROLOGY

## 2022-07-15 PROCEDURE — 84157 ASSAY OF PROTEIN OTHER: CPT | Performed by: PSYCHIATRY & NEUROLOGY

## 2022-07-15 NOTE — DISCHARGE SUMMARY
Pre Op Diagnosis: bells palsy     Post Op Diagnosis: same     Procedure:  LP     Procedure performed by: Walker CALLAWAY, Vick CISNEROS     Written Informed Consent Obtained: Yes     Specimen Removed:  yes (type of tissue to be determined by lab analysis)     Estimated Blood Loss:  minimal    Moderate Sedation: no     The patient tolerated the procedure well and there were no complications.      Sterile technique was performed in the lower back, lidocaine was used as a local anesthetic.  OP 22 cm of h2o, 10 ccs of clear csf to lab.  Pt tolerated the procedure well without immediate complications.  Please see radiologist report for details. F/u with PCP and/or ordering physician.

## 2022-07-16 LAB — CRYPTOC AG CSF QL LA: NEGATIVE

## 2022-07-18 LAB
PATH INTERP FLD-IMP: NORMAL
VDRL CSF QL: NEGATIVE

## 2022-07-19 ENCOUNTER — OFFICE VISIT (OUTPATIENT)
Dept: RHEUMATOLOGY | Facility: CLINIC | Age: 32
End: 2022-07-19
Payer: COMMERCIAL

## 2022-07-19 VITALS
HEART RATE: 92 BPM | DIASTOLIC BLOOD PRESSURE: 81 MMHG | BODY MASS INDEX: 41.42 KG/M2 | HEIGHT: 62 IN | WEIGHT: 225.06 LBS | SYSTOLIC BLOOD PRESSURE: 115 MMHG

## 2022-07-19 DIAGNOSIS — G51.0 RIGHT-SIDED BELL'S PALSY: ICD-10-CM

## 2022-07-19 DIAGNOSIS — Z71.89 COUNSELING ON HEALTH PROMOTION AND DISEASE PREVENTION: ICD-10-CM

## 2022-07-19 DIAGNOSIS — R76.8 ANA POSITIVE: Primary | ICD-10-CM

## 2022-07-19 DIAGNOSIS — G51.0 LEFT-SIDED BELL'S PALSY: ICD-10-CM

## 2022-07-19 PROCEDURE — 99205 OFFICE O/P NEW HI 60 MIN: CPT | Mod: S$GLB,,, | Performed by: INTERNAL MEDICINE

## 2022-07-19 PROCEDURE — 3079F DIAST BP 80-89 MM HG: CPT | Mod: CPTII,S$GLB,, | Performed by: INTERNAL MEDICINE

## 2022-07-19 PROCEDURE — 3074F SYST BP LT 130 MM HG: CPT | Mod: CPTII,S$GLB,, | Performed by: INTERNAL MEDICINE

## 2022-07-19 PROCEDURE — 99999 PR PBB SHADOW E&M-EST. PATIENT-LVL IV: CPT | Mod: PBBFAC,,, | Performed by: INTERNAL MEDICINE

## 2022-07-19 PROCEDURE — 3074F PR MOST RECENT SYSTOLIC BLOOD PRESSURE < 130 MM HG: ICD-10-PCS | Mod: CPTII,S$GLB,, | Performed by: INTERNAL MEDICINE

## 2022-07-19 PROCEDURE — 3008F PR BODY MASS INDEX (BMI) DOCUMENTED: ICD-10-PCS | Mod: CPTII,S$GLB,, | Performed by: INTERNAL MEDICINE

## 2022-07-19 PROCEDURE — 3008F BODY MASS INDEX DOCD: CPT | Mod: CPTII,S$GLB,, | Performed by: INTERNAL MEDICINE

## 2022-07-19 PROCEDURE — 99999 PR PBB SHADOW E&M-EST. PATIENT-LVL IV: ICD-10-PCS | Mod: PBBFAC,,, | Performed by: INTERNAL MEDICINE

## 2022-07-19 PROCEDURE — 3079F PR MOST RECENT DIASTOLIC BLOOD PRESSURE 80-89 MM HG: ICD-10-PCS | Mod: CPTII,S$GLB,, | Performed by: INTERNAL MEDICINE

## 2022-07-19 PROCEDURE — 99205 PR OFFICE/OUTPT VISIT, NEW, LEVL V, 60-74 MIN: ICD-10-PCS | Mod: S$GLB,,, | Performed by: INTERNAL MEDICINE

## 2022-07-19 PROCEDURE — 1159F PR MEDICATION LIST DOCUMENTED IN MEDICAL RECORD: ICD-10-PCS | Mod: CPTII,S$GLB,, | Performed by: INTERNAL MEDICINE

## 2022-07-19 PROCEDURE — 1159F MED LIST DOCD IN RCRD: CPT | Mod: CPTII,S$GLB,, | Performed by: INTERNAL MEDICINE

## 2022-07-19 RX ORDER — PREDNISONE 10 MG/1
TABLET ORAL
Qty: 65 TABLET | Refills: 0 | Status: SHIPPED | OUTPATIENT
Start: 2022-07-19 | End: 2022-08-23

## 2022-07-19 RX ORDER — ALPRAZOLAM 0.25 MG/1
0.25 TABLET ORAL DAILY PRN
COMMUNITY
Start: 2022-07-18 | End: 2022-10-05

## 2022-07-19 NOTE — PROGRESS NOTES
Labs Reviewed:      07-    CSF Cx -ve, Protein CSF NL, VDRL CSF -ve,  Crypto CSF,   CSF Lymph 85^ elevate NL variants WBC 9 ^ Glucose CSF 87 ^  Normal variants     Labs unremarkable

## 2022-07-19 NOTE — PROGRESS NOTES
RHEUMATOLOGY OUTPATIENT CLINIC NOTE    2022    Attending Rheumatologist: Vicente Sanchez  Primary Care Provider/Physician Requesting Consultation: Sonia Olvera MD   Chief Complaint/Reason For Consultation:  bell's palsey and Positive RAPHAEL      Subjective:     Fabiola Viera is a 32 y.o. White female with Bell;s palsy and positive RAPHAEL referred for rheumatic evaluation.    Main concern of recurrent Bel's palsy.  Last episode occurring postpartum of  delivery of healthy child (2022), next occurring on opposite side (Rt. ) occurring approximately late .  Received course of steroids and antivirals with modest improvement.    Review of Systems   Constitutional: Negative for fever.   HENT:        Denies recurrent pain less ulcerations on oral or nasal mucosa.  Describes moderate xerophthalmia, mild xerostomia   Eyes: Negative for redness.        No history uveitis or corneal ulcerations   Respiratory: Negative for cough and shortness of breath.    Cardiovascular: Negative for chest pain.   Gastrointestinal: Negative for blood in stool, constipation, diarrhea and melena.   Genitourinary: Negative for dysuria, hematuria and urgency.   Skin: Negative for rash.        Denies photosensitivity or Raynaud's phenomena   Neurological: Positive for weakness. Negative for headaches.   Endo/Heme/Allergies: Does not bruise/bleed easily.        Denies recurrent pregnancy loss after 10 weeks gestation, recurrent preeclampsia x2.  No history of vascular thrombosis       Chronic comorbid conditions affecting medical decision making today:  Past Medical History:   Diagnosis Date    Abnormal Pap smear of cervix     Anxiety     Encounter for general adult medical examination without abnormal findings 2016    Encounter for screening for infections with predominantly sexual mode of transmission     Hypertension     Insulin resistance     Insulin resistance     Insulin resistance     Irregular menses      Labyrinthine disorder     Polycystic ovaries     Sciatica      Past Surgical History:   Procedure Laterality Date    ADENOIDECTOMY       SECTION      TONSILLECTOMY       Family History   Problem Relation Age of Onset    Hypertension Father     Prostate cancer Father     Depression Maternal Grandmother     Hypertension Maternal Grandmother     Cancer Maternal Grandfather     Colon cancer Maternal Grandfather     Hypertension Paternal Grandmother     Prostate cancer Paternal Grandfather     Diabetes Other     Breast cancer Other      Social History     Substance and Sexual Activity   Alcohol Use Not Currently     Social History     Tobacco Use   Smoking Status Never Smoker   Smokeless Tobacco Never Used     Social History     Substance and Sexual Activity   Drug Use Never       Current Outpatient Medications:     ALPRAZolam (XANAX) 0.25 MG tablet, Take 0.25 mg by mouth daily as needed., Disp: , Rfl:     citalopram (CELEXA) 20 MG tablet, Take 1 tablet (20 mg total) by mouth once daily., Disp: 30 tablet, Rfl: 11    gabapentin (NEURONTIN) 300 MG capsule, Take 1 capsule (300 mg total) by mouth 3 (three) times daily., Disp: 90 capsule, Rfl: 11    hydroCHLOROthiazide (HYDRODIURIL) 25 MG tablet, TAKE 1/2 TABLET(12.5 MG) BY MOUTH EVERY DAY, Disp: 45 tablet, Rfl: 0    ketoconazole (NIZORAL) 2 % cream, Apply topically 2 (two) times daily., Disp: 60 g, Rfl: 1    L. reuteri/L. rhamnosus (REPHRESH PRO-B ORAL), Take 1 each by mouth once daily., Disp: , Rfl:     metFORMIN (GLUCOPHAGE-XR) 500 MG ER 24hr tablet, 2 tabs once daily with a meal, Disp: 60 tablet, Rfl: 3    NIFEdipine (PROCARDIA-XL) 60 MG (OSM) 24 hr tablet, Take 1 tablet (60 mg total) by mouth 2 (two) times a day., Disp: 60 tablet, Rfl: 3    norethindrone-ethinyl estradiol (JUNEL FE ) 1 mg-20 mcg (21)/75 mg (7) per tablet, Take 1 tablet by mouth once daily., Disp: 30 tablet, Rfl: 11    diazePAM (VALIUM) 5 MG tablet, 1 tab 30  minutes before MRI (Patient not taking: Reported on 2022), Disp: 1 tablet, Rfl: 0    labetaloL (NORMODYNE) 200 MG tablet, Take 1 tablet (200 mg total) by mouth every 12 (twelve) hours., Disp: 60 tablet, Rfl: 3    valACYclovir (VALTREX) 1000 MG tablet, Take 1 tablet (1,000 mg total) by mouth 3 (three) times daily. for 7 days, Disp: 21 tablet, Rfl: 0     Objective:     Vitals:    22 1233   BP: 115/81   Pulse: 92     Physical Exam   Constitutional: She appears obese.   Eyes: Conjunctivae are normal.   Pulmonary/Chest: Effort normal.   Musculoskeletal:         General: No swelling or tenderness.   Neurological: She displays no weakness. A cranial nerve deficit (Bell's palsy right side) is present.   Skin: No rash noted.       Reviewed available old and all outside pertinent medical records available.    All lab results personally reviewed and interpreted by me.  Lab Results   Component Value Date    WBC 7.27 2022    HGB 11.5 (A) 2022    HCT 36.3 (A) 2022    MCV 85.6 2022    RDW 14.5 2022     2022    NEUTROABS 4.26 2022       Lab Results   Component Value Date    BUN 11 2022    AST 20 2022    ALT 15 2022       Lab Results   Component Value Date    LEUKOCYTESUR Large (A) 2022    NITRITE Negative 2022       No results found for: PTH    Lab Results   Component Value Date    URICACID 4.8 2022       Lab Results   Component Value Date    CRP 13.2 (H) 2022       Lab Results   Component Value Date    ANATITER 1:640 2022       No components found for: TSPOTTB,  QUANTIFERON     ASSESSMENT:     High titer RAPHAEL detected during evaluation for recurrent Bell's palsy.  For first-time episode occurring after  on April of this year, resolution after steroid trial.  Recurrent event on late  on opposite side (Rt.)  Which remains persistent.  On review of systems described moderate sicca symptoms.  Denies  photosensitivity, Raynaud's phenomena, recurrent pain less mucosa ulcerations or active joint swelling.  Currently without objective manifestations of end organ damage pertinent to presence of autoantibodies.  NBA panel negative.  Rest of workup unrevealing.  MRI without immune demyelinating plaques and active facial nerve enhancement. Traumatic CSF tap but no protein detected.  Will add more specific autoantibodies to determine clinical significance of RAPHAEL.  Challenging to determine if current presentation consistent with SjS or early incomplete lupus with peripheral nerve involvement (rare/atypical presentation) or Bell's palsy with and elevation RAPHAEL.  Recommend another trial of steroid taper to discontinue and consider CellCept as steroid sparing agent depending on clinical response.  Side effects of therapy discussed, written literature provided.    PLAN:     1. Right-sided Bell's palsy    2. Left-sided Bell's palsy    3. RAPHAEL positive      Disclaimer: This note is prepared using voice recognition software and as such is likely to have errors and has not been proof read. Please contact me for questions.       Vicente Sanchez M.D.      Answers for HPI/ROS submitted by the patient on 7/16/2022  fever: No  eye redness: No  mouth sores: No  headaches: No  shortness of breath: No  chest pain: No  trouble swallowing: No  diarrhea: No  constipation: No  unexpected weight change: No  genital sore: No  dysuria: No  During the last 3 days, have you had a skin rash?: No  Bruises or bleeds easily: No  cough: No

## 2022-07-21 LAB
BACTERIA CSF CULT: NO GROWTH
GRAM STN SPEC: NORMAL

## 2022-07-26 ENCOUNTER — PATIENT MESSAGE (OUTPATIENT)
Dept: PRIMARY CARE CLINIC | Facility: CLINIC | Age: 32
End: 2022-07-26
Payer: COMMERCIAL

## 2022-07-26 ENCOUNTER — PATIENT MESSAGE (OUTPATIENT)
Dept: NEUROLOGY | Facility: CLINIC | Age: 32
End: 2022-07-26
Payer: COMMERCIAL

## 2022-07-27 ENCOUNTER — LAB VISIT (OUTPATIENT)
Dept: LAB | Facility: HOSPITAL | Age: 32
End: 2022-07-27
Attending: INTERNAL MEDICINE
Payer: COMMERCIAL

## 2022-07-27 DIAGNOSIS — R76.8 ANA POSITIVE: ICD-10-CM

## 2022-07-27 DIAGNOSIS — E88.819 INSULIN RESISTANCE: Primary | ICD-10-CM

## 2022-07-27 DIAGNOSIS — G51.0 RIGHT-SIDED BELL'S PALSY: ICD-10-CM

## 2022-07-27 DIAGNOSIS — I10 PRIMARY HYPERTENSION: ICD-10-CM

## 2022-07-27 DIAGNOSIS — E66.01 CLASS 3 SEVERE OBESITY WITH SERIOUS COMORBIDITY AND BODY MASS INDEX (BMI) OF 40.0 TO 44.9 IN ADULT, UNSPECIFIED OBESITY TYPE: ICD-10-CM

## 2022-07-27 DIAGNOSIS — Z86.32 HISTORY OF GESTATIONAL DIABETES: ICD-10-CM

## 2022-07-27 DIAGNOSIS — G51.0 LEFT-SIDED BELL'S PALSY: ICD-10-CM

## 2022-07-27 LAB
ALBUMIN SERPL BCP-MCNC: 3.5 G/DL (ref 3.5–5.2)
ALP SERPL-CCNC: 45 U/L (ref 55–135)
ALT SERPL W/O P-5'-P-CCNC: 26 U/L (ref 10–44)
ANION GAP SERPL CALC-SCNC: 14 MMOL/L (ref 8–16)
AST SERPL-CCNC: 14 U/L (ref 10–40)
BACTERIA #/AREA URNS HPF: ABNORMAL /HPF
BASOPHILS # BLD AUTO: 0.02 K/UL (ref 0–0.2)
BASOPHILS NFR BLD: 0.2 % (ref 0–1.9)
BILIRUB SERPL-MCNC: 0.3 MG/DL (ref 0.1–1)
BILIRUB UR QL STRIP: NEGATIVE
BUN SERPL-MCNC: 14 MG/DL (ref 6–20)
C3 SERPL-MCNC: 168 MG/DL (ref 50–180)
C4 SERPL-MCNC: 28 MG/DL (ref 11–44)
CALCIUM SERPL-MCNC: 9 MG/DL (ref 8.7–10.5)
CHLORIDE SERPL-SCNC: 97 MMOL/L (ref 95–110)
CLARITY UR: CLEAR
CO2 SERPL-SCNC: 31 MMOL/L (ref 23–29)
COLOR UR: YELLOW
CREAT SERPL-MCNC: 0.7 MG/DL (ref 0.5–1.4)
CREAT UR-MCNC: 313.1 MG/DL (ref 15–325)
DIFFERENTIAL METHOD: ABNORMAL
EOSINOPHIL # BLD AUTO: 0.1 K/UL (ref 0–0.5)
EOSINOPHIL NFR BLD: 0.7 % (ref 0–8)
ERYTHROCYTE [DISTWIDTH] IN BLOOD BY AUTOMATED COUNT: 14.2 % (ref 11.5–14.5)
EST. GFR  (AFRICAN AMERICAN): >60 ML/MIN/1.73 M^2
EST. GFR  (NON AFRICAN AMERICAN): >60 ML/MIN/1.73 M^2
ESTIMATED AVG GLUCOSE: 128 MG/DL (ref 68–131)
GLUCOSE SERPL-MCNC: 119 MG/DL (ref 70–110)
GLUCOSE UR QL STRIP: NEGATIVE
HBA1C MFR BLD: 6.1 % (ref 4–5.6)
HCT VFR BLD AUTO: 38.8 % (ref 37–48.5)
HGB BLD-MCNC: 11.1 G/DL (ref 12–16)
HGB UR QL STRIP: ABNORMAL
HYALINE CASTS #/AREA URNS LPF: 2 /LPF
IMM GRANULOCYTES # BLD AUTO: 0.03 K/UL (ref 0–0.04)
IMM GRANULOCYTES NFR BLD AUTO: 0.3 % (ref 0–0.5)
KETONES UR QL STRIP: NEGATIVE
LEUKOCYTE ESTERASE UR QL STRIP: ABNORMAL
LYMPHOCYTES # BLD AUTO: 2.9 K/UL (ref 1–4.8)
LYMPHOCYTES NFR BLD: 30.2 % (ref 18–48)
MCH RBC QN AUTO: 25.1 PG (ref 27–31)
MCHC RBC AUTO-ENTMCNC: 28.6 G/DL (ref 32–36)
MCV RBC AUTO: 88 FL (ref 82–98)
MICROSCOPIC COMMENT: ABNORMAL
MONOCYTES # BLD AUTO: 0.8 K/UL (ref 0.3–1)
MONOCYTES NFR BLD: 8.7 % (ref 4–15)
NEUTROPHILS # BLD AUTO: 5.7 K/UL (ref 1.8–7.7)
NEUTROPHILS NFR BLD: 59.9 % (ref 38–73)
NITRITE UR QL STRIP: NEGATIVE
NRBC BLD-RTO: 0 /100 WBC
PH UR STRIP: 7 [PH] (ref 5–8)
PLATELET # BLD AUTO: 311 K/UL (ref 150–450)
PMV BLD AUTO: 9.9 FL (ref 9.2–12.9)
POTASSIUM SERPL-SCNC: 3 MMOL/L (ref 3.5–5.1)
PROT SERPL-MCNC: 6.5 G/DL (ref 6–8.4)
PROT UR QL STRIP: ABNORMAL
PROT UR-MCNC: 54 MG/DL (ref 0–15)
PROT/CREAT UR: 0.17 MG/G{CREAT} (ref 0–0.2)
RBC # BLD AUTO: 4.43 M/UL (ref 4–5.4)
RBC #/AREA URNS HPF: 5 /HPF (ref 0–4)
SODIUM SERPL-SCNC: 142 MMOL/L (ref 136–145)
SP GR UR STRIP: 1.02 (ref 1–1.03)
URN SPEC COLLECT METH UR: ABNORMAL
WBC # BLD AUTO: 9.47 K/UL (ref 3.9–12.7)
WBC #/AREA URNS HPF: 10 /HPF (ref 0–5)

## 2022-07-27 PROCEDURE — 81000 URINALYSIS NONAUTO W/SCOPE: CPT | Performed by: INTERNAL MEDICINE

## 2022-07-27 PROCEDURE — 86255 FLUORESCENT ANTIBODY SCREEN: CPT | Performed by: INTERNAL MEDICINE

## 2022-07-27 PROCEDURE — 86146 BETA-2 GLYCOPROTEIN ANTIBODY: CPT | Performed by: INTERNAL MEDICINE

## 2022-07-27 PROCEDURE — 0034U TPMT NUDT15 GENES: CPT | Performed by: INTERNAL MEDICINE

## 2022-07-27 PROCEDURE — 83516 IMMUNOASSAY NONANTIBODY: CPT | Mod: 59 | Performed by: INTERNAL MEDICINE

## 2022-07-27 PROCEDURE — 86235 NUCLEAR ANTIGEN ANTIBODY: CPT | Mod: 59 | Performed by: INTERNAL MEDICINE

## 2022-07-27 PROCEDURE — 86147 CARDIOLIPIN ANTIBODY EA IG: CPT | Performed by: INTERNAL MEDICINE

## 2022-07-27 PROCEDURE — 83520 IMMUNOASSAY QUANT NOS NONAB: CPT | Performed by: INTERNAL MEDICINE

## 2022-07-27 PROCEDURE — 83036 HEMOGLOBIN GLYCOSYLATED A1C: CPT | Performed by: INTERNAL MEDICINE

## 2022-07-27 PROCEDURE — 86225 DNA ANTIBODY NATIVE: CPT | Performed by: INTERNAL MEDICINE

## 2022-07-27 PROCEDURE — 82570 ASSAY OF URINE CREATININE: CPT | Performed by: INTERNAL MEDICINE

## 2022-07-27 PROCEDURE — 80053 COMPREHEN METABOLIC PANEL: CPT | Performed by: INTERNAL MEDICINE

## 2022-07-27 PROCEDURE — 85730 THROMBOPLASTIN TIME PARTIAL: CPT | Performed by: INTERNAL MEDICINE

## 2022-07-27 PROCEDURE — 83516 IMMUNOASSAY NONANTIBODY: CPT | Performed by: INTERNAL MEDICINE

## 2022-07-27 PROCEDURE — 85025 COMPLETE CBC W/AUTO DIFF WBC: CPT | Performed by: INTERNAL MEDICINE

## 2022-07-27 PROCEDURE — 86160 COMPLEMENT ANTIGEN: CPT | Mod: 59 | Performed by: INTERNAL MEDICINE

## 2022-07-27 PROCEDURE — 86160 COMPLEMENT ANTIGEN: CPT | Performed by: INTERNAL MEDICINE

## 2022-07-27 RX ORDER — SEMAGLUTIDE 1.34 MG/ML
INJECTION, SOLUTION SUBCUTANEOUS
Qty: 1 PEN | Refills: 1 | Status: SHIPPED | OUTPATIENT
Start: 2022-07-27 | End: 2022-08-16

## 2022-07-28 ENCOUNTER — PATIENT MESSAGE (OUTPATIENT)
Dept: RHEUMATOLOGY | Facility: CLINIC | Age: 32
End: 2022-07-28
Payer: COMMERCIAL

## 2022-07-28 LAB — DSDNA AB SER-ACNC: NORMAL [IU]/ML

## 2022-07-29 ENCOUNTER — TELEPHONE (OUTPATIENT)
Dept: RHEUMATOLOGY | Facility: CLINIC | Age: 32
End: 2022-07-29
Payer: COMMERCIAL

## 2022-07-29 NOTE — TELEPHONE ENCOUNTER
----- Message from Vicente Sanchez MD sent at 7/28/2022  1:39 PM CDT -----  Recommend follow-up with primary care provider for prediabetes and abnormal urinalysis.  Rest of workup within acceptable range thus far.  Will inform if significantly abnormal lab results.

## 2022-07-29 NOTE — TELEPHONE ENCOUNTER
----- Message from Mari Clarke sent at 7/29/2022  9:33 AM CDT -----  Contact: Patient  Patient called to consult with nurse or staff regarding test results. She would like a call back and can be reached at [phone]. Thanks/

## 2022-07-30 LAB
B2 GLYCOPROT1 IGA SER QL: <9 SAU
B2 GLYCOPROT1 IGG SER QL: <9 SGU
B2 GLYCOPROT1 IGM SER QL: <9 SMU
RIBOSOMAL P IGG SER-ACNC: <0.2 U

## 2022-07-31 LAB — HISTONE IGG SER IA-ACNC: 0.2 UNITS (ref 0–0.9)

## 2022-08-01 LAB
CARDIOLIPIN IGG SER IA-ACNC: <9.4 GPL (ref 0–14.99)
CARDIOLIPIN IGM SER IA-ACNC: 9.5 MPL (ref 0–12.49)
NUDT15 GENOTYPE: NORMAL
NUDT15 PHENOTYPE: NORMAL
TPMT ADDITIONAL INFORMATION: NORMAL
TPMT DISCLAIMER: NORMAL
TPMT GENOTYPE RESULT: NORMAL
TPMT INTERPRETATION: NORMAL
TPMT METHOD: NORMAL
TPMT PHENOTYPE: NORMAL
TPMT REVIEWED BY: NORMAL

## 2022-08-02 ENCOUNTER — PATIENT MESSAGE (OUTPATIENT)
Dept: RHEUMATOLOGY | Facility: CLINIC | Age: 32
End: 2022-08-02
Payer: COMMERCIAL

## 2022-08-07 LAB
APTT IMM NP PPP: NORMAL SEC (ref 32–48)
APTT P HEP NEUT PPP: NORMAL SEC (ref 32–48)
CONFIRM APTT STACLOT: NORMAL
DRVVT SCREEN TO CONFIRM RATIO: NORMAL RATIO
LA 3 SCREEN W REFLEX-IMP: NORMAL
LA NT DPL PPP QL: NORMAL
MIXING DRVVT: NORMAL SEC (ref 33–44)
PROTHROMBIN TIME: 12.9 SEC (ref 12–15.5)
REPTILASE TIME: NORMAL SEC
SCREEN APTT: 45 SEC (ref 32–48)
SCREEN DRVVT: 41 SEC (ref 33–44)
THROMBIN TIME: NORMAL SEC (ref 14.7–19.5)

## 2022-08-11 ENCOUNTER — PATIENT MESSAGE (OUTPATIENT)
Dept: PRIMARY CARE CLINIC | Facility: CLINIC | Age: 32
End: 2022-08-11
Payer: COMMERCIAL

## 2022-08-13 LAB
ANTI-PM/SCL AB: <20 UNITS
ANTI-SS-A 52 KD AB, IGG: <20 UNITS
EJ AB SER QL: NEGATIVE
ENA JO1 AB SER IA-ACNC: <20 UNITS
ENA SM+RNP AB SER IA-ACNC: <20 UNITS
FIBRILLARIN (U3 RNP): NEGATIVE
KU AB SER QL: NEGATIVE
MDA-5 (P140): <20 UNITS
MI2 AB SER QL: NEGATIVE
NXP-2 (P140): <20 UNITS
OJ AB SER QL: NEGATIVE
PL12 AB SER QL: NEGATIVE
PL7 AB SER QL: NEGATIVE
SRP AB SERPL QL: NEGATIVE
TIF1 GAMMA (P155/140): <20 UNITS
U2 SNRNP: NEGATIVE

## 2022-08-15 LAB — NMO/AQP4 FACS,S: NEGATIVE

## 2022-08-16 ENCOUNTER — OFFICE VISIT (OUTPATIENT)
Dept: PRIMARY CARE CLINIC | Facility: CLINIC | Age: 32
End: 2022-08-16
Payer: COMMERCIAL

## 2022-08-16 VITALS
DIASTOLIC BLOOD PRESSURE: 78 MMHG | WEIGHT: 219.56 LBS | OXYGEN SATURATION: 99 % | RESPIRATION RATE: 16 BRPM | HEIGHT: 62 IN | SYSTOLIC BLOOD PRESSURE: 122 MMHG | BODY MASS INDEX: 40.4 KG/M2 | HEART RATE: 78 BPM

## 2022-08-16 DIAGNOSIS — E88.819 INSULIN RESISTANCE: Primary | ICD-10-CM

## 2022-08-16 DIAGNOSIS — E66.01 CLASS 3 SEVERE OBESITY WITH SERIOUS COMORBIDITY AND BODY MASS INDEX (BMI) OF 40.0 TO 44.9 IN ADULT, UNSPECIFIED OBESITY TYPE: Chronic | ICD-10-CM

## 2022-08-16 DIAGNOSIS — G51.0 BELL PALSY: ICD-10-CM

## 2022-08-16 DIAGNOSIS — I10 HYPERTENSION, UNSPECIFIED TYPE: ICD-10-CM

## 2022-08-16 PROBLEM — G93.2 IDIOPATHIC INTRACRANIAL HYPERTENSION: Status: ACTIVE | Noted: 2022-07-10

## 2022-08-16 PROBLEM — O14.90 PRE-ECLAMPSIA AFFECTING PREGNANCY, ANTEPARTUM: Status: ACTIVE | Noted: 2022-08-16

## 2022-08-16 PROBLEM — Z87.59 HISTORY OF PRE-ECLAMPSIA: Status: ACTIVE | Noted: 2022-07-10

## 2022-08-16 PROBLEM — R73.03 PRE-DIABETES: Status: ACTIVE | Noted: 2022-08-04

## 2022-08-16 PROCEDURE — 1160F PR REVIEW ALL MEDS BY PRESCRIBER/CLIN PHARMACIST DOCUMENTED: ICD-10-PCS | Mod: CPTII,S$GLB,, | Performed by: FAMILY MEDICINE

## 2022-08-16 PROCEDURE — 3078F DIAST BP <80 MM HG: CPT | Mod: CPTII,S$GLB,, | Performed by: FAMILY MEDICINE

## 2022-08-16 PROCEDURE — 1159F PR MEDICATION LIST DOCUMENTED IN MEDICAL RECORD: ICD-10-PCS | Mod: CPTII,S$GLB,, | Performed by: FAMILY MEDICINE

## 2022-08-16 PROCEDURE — 3044F PR MOST RECENT HEMOGLOBIN A1C LEVEL <7.0%: ICD-10-PCS | Mod: CPTII,S$GLB,, | Performed by: FAMILY MEDICINE

## 2022-08-16 PROCEDURE — 99999 PR PBB SHADOW E&M-EST. PATIENT-LVL III: ICD-10-PCS | Mod: PBBFAC,,, | Performed by: FAMILY MEDICINE

## 2022-08-16 PROCEDURE — 99214 PR OFFICE/OUTPT VISIT, EST, LEVL IV, 30-39 MIN: ICD-10-PCS | Mod: S$GLB,,, | Performed by: FAMILY MEDICINE

## 2022-08-16 PROCEDURE — 99214 OFFICE O/P EST MOD 30 MIN: CPT | Mod: S$GLB,,, | Performed by: FAMILY MEDICINE

## 2022-08-16 PROCEDURE — 1160F RVW MEDS BY RX/DR IN RCRD: CPT | Mod: CPTII,S$GLB,, | Performed by: FAMILY MEDICINE

## 2022-08-16 PROCEDURE — 3078F PR MOST RECENT DIASTOLIC BLOOD PRESSURE < 80 MM HG: ICD-10-PCS | Mod: CPTII,S$GLB,, | Performed by: FAMILY MEDICINE

## 2022-08-16 PROCEDURE — 3008F BODY MASS INDEX DOCD: CPT | Mod: CPTII,S$GLB,, | Performed by: FAMILY MEDICINE

## 2022-08-16 PROCEDURE — 1159F MED LIST DOCD IN RCRD: CPT | Mod: CPTII,S$GLB,, | Performed by: FAMILY MEDICINE

## 2022-08-16 PROCEDURE — 99999 PR PBB SHADOW E&M-EST. PATIENT-LVL III: CPT | Mod: PBBFAC,,, | Performed by: FAMILY MEDICINE

## 2022-08-16 PROCEDURE — 3044F HG A1C LEVEL LT 7.0%: CPT | Mod: CPTII,S$GLB,, | Performed by: FAMILY MEDICINE

## 2022-08-16 PROCEDURE — 3074F SYST BP LT 130 MM HG: CPT | Mod: CPTII,S$GLB,, | Performed by: FAMILY MEDICINE

## 2022-08-16 PROCEDURE — 3008F PR BODY MASS INDEX (BMI) DOCUMENTED: ICD-10-PCS | Mod: CPTII,S$GLB,, | Performed by: FAMILY MEDICINE

## 2022-08-16 PROCEDURE — 3074F PR MOST RECENT SYSTOLIC BLOOD PRESSURE < 130 MM HG: ICD-10-PCS | Mod: CPTII,S$GLB,, | Performed by: FAMILY MEDICINE

## 2022-08-16 RX ORDER — SEMAGLUTIDE 1.34 MG/ML
1 INJECTION, SOLUTION SUBCUTANEOUS
Qty: 1 PEN | Refills: 1 | Status: SHIPPED | OUTPATIENT
Start: 2022-08-16 | End: 2022-08-23 | Stop reason: SDUPTHER

## 2022-08-16 RX ORDER — SEMAGLUTIDE 1.34 MG/ML
1 INJECTION, SOLUTION SUBCUTANEOUS
Qty: 1 PEN | Refills: 1 | Status: SHIPPED | OUTPATIENT
Start: 2022-08-16 | End: 2022-08-16 | Stop reason: SDUPTHER

## 2022-08-16 NOTE — PATIENT INSTRUCTIONS
"Continue your current medication.  Please make sure to let me know how you are feeling.         PRODUCE  [] All fresh fruit   [] All fresh vegetables   [] All fresh herbs  [] All herb purees + pastes  [] Pre-spiralized vegetable noodles   [] Steam-In-The-Bag begetables  [] Riced cauliflower  [] Jicama sticks  [] Love Beets  all varieties  [] Wholly Guacamole  all varieties  [] Hummus  all varieties, chickpea + vegetable  [] Tofu Shirataki noodles    [] Tofu  all varieties  [] Tempeh  all varieties    PROTEIN  CHICKEN   [] Boneless, skinless breasts  [] Boneless, skinless thighs  [] Ground chicken breast, at least 93% lean  [] Chicken breast cutlet  [] Aidell's  Chicken Sausage + Chicken Meatballs    TURKEY   [] Turkey breast tenderloin   [] Ground turkey breast, at least 93% lean  [] Xander Naturals  Turkey Sausage    BEEF  [] Tenderloin  [] Sirloin  [] Top Loin  [] Flank Steak  [] Round Steak  [] Filet  [] Lean ground beef, at least 93% lean + grass-fed preferable    PORK  [] Tenderloin  [] Pork Chop  [] Center Cut  [] Xander Naturals  No-Sugar [place]    BISON  [] [place]  90 - 95% lean    SEAFOOD  [] All fresh fish + seafood; locally sourced when possible  [] Smoked salmon    HEAT + EAT ENTREES   [] Ryne's Natural Foods  Chicken, Pork, Beef  [] Bc  "All Natural" Grilled Chicken Breast + Strips, all varieties    SAUCES SPREADS + DIPS  [] Bitchin Sauce  Original, Chipotle, Cilantro Orient  [] Ella's Kitchen  Tzatziki Yogurt Dip, Babaganoush, Hummus  [] Wholly Guacamole  all varieties  [] Hummus  all varieties  [] [place] Gringo Salsa  all varieties  [] Mrs. Martin's Salsa  all varieties  [] Stubb's All Natural BBQ Sauce  [] Primal Kitchen  Feliz, Ketchup, BBQ Sauce  [] Primal Kitchen Pasta Sauce  Roasted Garlic, Tomato Basil, no-dairy Vodka Sauce  [] Sal & Eden's  HeartSmart Pasta Sauce    DAIRY/DAIRY SUBSTITUTES/EGGS  EGGS   [] All eggs  cage-free, pasture-raise preferable  [] " Crepini  egg wraps  [] Vital Farms  Pasture-Raised Egg Bites  [] JUST Egg [vegan]     CREAMERS   [] Califia  Better Half, original + vanilla unsweetened  [] NutPods  all varieties    MILK   [] Horizon Organic  all varieties except chocolate  [] Organic Valley  all varieties except chocolate  [] Organic Valley  ultra-filtered, reduced fat milk     PLANT_BASED MILK ALTERNATIVES  [] All unsweetened almond milks  original, vanilla + chocolate  [] Ripple  unsweetened   [] Milkadamia  original +_ vanilla, unsweetened   [] Forager  original + vanilla, unsweetened   [] Silk Organic  soy milk, unsweetened  [] Oatly  unsweetened  [] Califia  regular + protein-fortified oat milk, unsweetened     CHEESES  [] Regular or reduced fat cheeses  [] BelGioso  Fresh Mozzarella Snack Packs, Parmesan Power-full Snack   [] Goat cheese  [] Fresh mozzarella  [] String cheese  all varieties  [] Herminia Cottage Cheese  [] Va's Cultured Cottage Cheese  [] Lulu Life 'Just Like Mozzarella'  plant-based shreds and other varieties  [] Parmela Creamery  plant-based shredded cheese    YOGURT  [] Fage  2% low-fat, plain  [] Siggi's  plain, vanilla  [] Chobani Greek  nonfat + whole milk yogurt, plain   [] Chobani Less Sugar  all flavored varieties   [] Oikos Greek  nonfat, plain  [] Two Good  all varieties   [] Faroese Provisions  plain  [] [place] Organic  low-fat + nonfat, plain  [] [facility]  goat milk yogurt, plain  [] Kefit  unsweetened, plain  [] Forager  Greek style unsweetened, plain [dairy-free]  [] [place]  unsweetened Greek style, plain [dairy-free]  [] [facility]  almond milk yogurt, vanilla or plain, unsweetened [dairy-free]    FREEZER SECTION  FROZEN VEGGIES  [] All plain frozen veggies + greens [e.g. broccoli, brussels, carrots, okra, mushrooms, zucchini, yellow squash, butternut squash, kale, spinach, fabby greens]  [] Riced veggies [e.g. cauliflower, broccoli, butternut squash]  [] Rachelme   all varieties  [] Green Giant  [] Veggie Spirals  [] Marinated Veggies [e.g. eggplant, peppers, zucchini]  [] Simply Steam [place] Sprouts  [] Birds Eye  [] Power Blend Italian Style  lentils, broccoli, kale, zucchini  []  [place] Sprouts & Carrots  [] Oven Roasters Broccoli & Cauliflower  [] [place] Blend  [] Tattooed   [] Green Bean Blend  [] Farmer's Market Ratatouille  [] Butter Balsamic Glazed Vegetables  [] Riced Cauliflower & Quinoa Mediterranean Style  [] Lisa's Good Life  Southern Style Greens [sauteed kale + onion]    FROZEN FRUITS  [] All unsweetened frozen fruits  all varieties  [] Dole Fruits & Veggie Blends  Berries 'n Kale  [] Dole Mix-ins  Triple Berry     FROZEN ENTREES  [] The Good Kitchen meals  all varieties [ e.g. Chili Lime Chicken Over Riced Cauliflower]  [] Premium Paleo  Not Gelacio Momma's Meatloaf  [] Primal Kitchen  Chicken Pesto + Steak Fajitas w/ Peppers & Onions  [] Eating Well Frozen Entrees  Butter Chicken Masala, Steak Carne Asada, Creamy Pesto Chicken, Chicken + Wild Rice Stroganoff, Yellow [place] Chicken, Sun-dried Tomato Chicken, Chicken Lo Mein  [] Realgood Entree Bowls  Samoan Inspired Beef Bowl over Riced Cauliflower, Chicken Burrito Bowl   [] Great Karma Coconut [place]  [] Diana's  [place] with Black Beans, Vegetable Lasagna  [] Kashi Mayan [place] Bake  [] Healthy Choice  Simply Steamers Chicken Fried Rice  [] Basil Pesto Chicken & South African Style Pork Power Bowls  [] Tattooed   Enchilada Bowl  [] Brisa Farms  Spicy Black Bean Burgers    FROZEN PIZZAS  [] Cauli'flour Foods  Cauliflower Pizza Crusts  [] Outer Aisle  Cauliflower Crust  [] Diana's  Veggie Crust Cheese Pizza  [] Quest Pizza     VEGETARIAN PRODUCTS  [] Beyond Meat  ground 'meat' + grilled 'chicken' strips  [] Tofurkey  Original Italian Sausage + Original [place]  [] Wally  Beefless Ground + Meatless Meatballs  [] [facility] Edelmira Mujica  Original Quynh  Crumbles, Meatballs    ICE CREAMS + FROZEN DESSERTS  [] Halo Top  regular + keto series, pops  [] Rebel  ice cream + dessert sandwiches  [] Enlightened  ice cream + bars  [] Nightfood  ice cream  [] Realgood  ice cream  [] Arctic Zero Fit  frozen pint  [] The Frozen Farmer  sorbets  [] Wholly Rollies  Protein Balls, all varieties  [] Dream Pops  Coconut Latte    FROZEN BREAKFASTS  [] Realgood  Breakfast Sandwiches on Cauliflower Cheesy Bread  [] Rebel  ice cream + dessert sandwiches  [] Enlightened  ice cream + bars  [] Nightfood  ice cream  [] Realgood  ice cream  [] Arctic Zero Fit  frozen pint  [] The Frozen Farmer  sorbets  [] Wholly Rollies  Protein Balls, all varieties  [] Dream Pops  Coconut Latte    BREADS/BUNS/WRAPS  [] Mati Bread: All Types - In Freezer Section   [] Flat Out Light Wraps - All Varieties   [] Flat Out Protein Up Carb Down Flat Bread   [] Kontos Whole Wheat Pocket Alina   [] Adriel MARIANNE 100% Whole Wheat Tortillas   [] LaTortilla Factory Tortillas - Smart & Delicious; 50 or 80-calorie   [] Nature's Own 100% Whole Wheat Bread   [] Orowheat Healthful - 100% Whole Wheat Slice Bread and [place]ida Thins   [] Orowheat Healthful - Whole Wheat Nuts & Grain Bread; Flax & Seed Bread   [] Pepperidge Farm Natural Whole Grain 15 Bread   [] Pepper[place] Farm Natural Whole Grain English Muffin - 100% Whole Wheat   [] [place] Very Thin 100% Whole Wheat   [] Radha Fernandez 45 Calories and Delightful   [] David' 100% Whole Wheat Thin-Sliced Bagels and English Muffins   [] Western Bagel: Perfect 10     GLUTEN FREE  [] Joe's Gluten Free Bread   [] [place]ona Bakehouse 7-Grain Gluten Free Bread     LEGUME PASTA   [] Explore Asian Organic Black Bean Spaghetti   [] Modern Table   [] Tolerant Foods       NUT BUTTERS & JELLIES    NUT BUTTERS   [] Better'n Chocolate: Coconut Chocolate Peanut Butter Spread   [] Better'n Peanut Butter - All Types   [] Earth Balance Coconut and Peanut Spread   [] Gerber's  Nut [place]   [] MaraNatha: All Natural Roasted Cashew Butter - [place] or Creamy   [] MaraNatha: Roasted Peanut Butter   [] Nuts 'N More Peanut [place] - All Flavors   [] PB2 Powder - Original or Chocolate   [] Skippy Natural - Creamy, Super Chunk   [] Smart Balance Peanut Butter - [place] or Creamy   [] Peanut Butter & Company:   [] Smooth , Crunch Time, The Heat Is On, Old Fashioned Smooth, Mighty Nut- Powdered Peanut Butter, Squeeze Pack   [] Smucker's Natural Peanut Butter - [place] or Creamy   [] Sunbutter Nut Butter   [] Wild Friends Protein Peanut Butter/[place] o Butter - Vanilla or Chocolate     JELLIES  o Polaner's All Fruit   o Clearly Organic Best Choice: Strawberry Fruit Spread       SNACKS    BARS  [] Kashi Bars - Chewy or Crunchy; Honey [place] o Flax or Peanut Butter   [] KIND Bars - 5 Grams of Sugar or Less   [] KIND Protein Bars - Strong and KIND   [] [facility] [place] Protein Bar - All Varieties   [] Nature Valley Roasted Nut Crunch - [place] Crunch; Peanut Crunch   [] [facility] Simple Nut Bar - Roasted Peanut & Honey   [] [facility] Simple Nut Bar - [place], Cashew & Sea Salt   [] [facility] Nut Millard Bar - Salted Caramel Peanut   [] Think Thin Protein Bars   [] Quest Bars, Power Crunch Bars, Pure Protein Bars     BEEF JERKY - NITRATE FREE   [] Game On   [] Grass Run Farms   [] Krave   [] Ostrim   [] Perky Jerky   [] Primal Strips Meatless Vegan Jerky   [] [place]     CHIPS   [] Beanitos Chips   [] Fruit Crisps - e.g. Brother's-All-Natural, Bare Fruit, Yoga Chips   [] Anni's Soy Crisps: 1.3 ounce bag   [] Quest Protein Chips   [] Wasa Whole Wheat Crisp Bread     CRACKERS  [] Lisa's Gone Crackers   [] Nabisco Triscuit: Regular and Thin Crisp Crackers   [] Vans Say Cheese Crackers (G-F)     POPCORN/NUTS   [] Abundio Robin's Smart Pop Popcorn - Single Serving   [] 100-Calorie Pack of Nuts - All Varieties     PROTEIN POWDERS & DRINKS  []  Protein -  Whey Protein Powder    [] Garden of Life Raw Protein Powder   [] Iconic Ready-To-Drink Protein Drink   [] Novak One Protein Powder   [] VegaSport Protein Powder     SALSA/HUMMUS/DIPS   [] Eat Well Embrace Life: Zesty Sriracha Carrot o Hummus   [] Pre-Portioned Guacamole Packs   [] Tasneem's   [] Tostitos Restaurant Style Salsa       SOUPS   [] Diana's Organic Soups - Lentil, Vegetable, Split Pea, Low-Sodium     CANNED GOODS   [] 100% Pure Pumpkin   [] BlueRunner Creole Cream-Style Red Beans or Navy Beans   [] Cajun Power Chicken Gumbo Base   [] Chicken of the Sea [place]   [] [place] Fresh Cut Sliced Beets   [] Hormel Breast of Chicken in Water   [] Le[place]er Tender Baby Whole Carrots   [] Kristie Tabasco [place] Starter   [] Heladioist: Chunk Lite Tuna in Water, Gourmet Select Pouches   [] Heladioist: Yellow[place] Tuna Fillets   [] Trappey's: Kidney, Butter, Zarate, Black Eye, Field, and Black Beans   [] ANDREW Rock's Turnip Greens or Rajeev Spinach     CONDIMENTS/ SAUCES/SPREADS/ SPICES  [] Monster Manuel's Magic Seasonings - Regular or Salt Free   [] Gricel Hutchinson's Sauces - All Flavors   [] Laughing Cow Light - All Flavors   [] Dash Salt-Free Marinade - All Flavors   [] Dm & Eden's: Heart Smart Pasta Sauces   [] Tabasco     SALAD DRESSINGS  [] Fartun's Naturals: Lite Honey Mustard   [] Qunitero's Own: Lighten Up Salad Dressing - All Varieties   [] OPA Greek Yogurt Dressings - Ranch, Blue o Cheese, Caesar, Feta Dill     SWEETENERS  [] Sweet [place] Sweetener   [] Swerve   [] Truvia     BEVERAGES  [] Coconut Water   [] Crystal Light PURE - All Flavors   [] Honest Tea: Just Green Tea, Unsweetened   [] Kombucha Tea   [] [place]   [] [place] Sisters Bloody Lisa Mix   [] Metromint - Zero-Sugar; All Natural Flavored   [] Stephon - Plain or Flavored   [] Anjana Milian   [] Steaz - Zero-Sugar, All-Natural, Sparkling Tea   [] Tea Bags: Any Brand - e.g. Renuka, Yogi, Tazzo, Celestial   [] V8 100% Vegetable Juice   [] Vitamin Water Zero   [] Water   []  Zevia - Stevia Sweetened Soft Drink     BEER/SHARIF/LIQUORS  []Haq's Premier Light 64 Calories   [] Bud Select - 55 Calories   [] [place] Sisters Bloody Lisa Mix   [] Ridley Genuine Draft - 64 Calories   [] Red or White Wine - All Varieties     CEREALS: HOT/COLD   [] Maureen's Puffin's Original Cereal  [] Enrrique's Mill Oat Bran Hot Cereal - Cracked Wheat, Multi-Grain  [] Kashi GoLean Cereal  [] Kashi GoLean Hot Cereal packets - Vanilla; Honey Cinnamon  [] Melia's Special K Protein Cereal  [] Genesis's Steel Cut Nora Oatmeal  [] Nature's Path Smart Bran  [] Orthodox Instant Oatmeal packet, Original  [] Orthodox Old Fashioned Orthodox Oats  [] Uncle Chung's Whole Wheat & Flaxseed Original Cereal

## 2022-08-16 NOTE — PROGRESS NOTES
Subjective:       Patient ID: Fabiola Viera is a 32 y.o. female.  Pmhx, fam hx, soc hx, surg hx, allergies, med list reviewed    Chief Complaint: Follow-up  Follow up insulin resistance    Wt Readings from Last 3 Encounters:   08/16/22 1023 99.6 kg (219 lb 9.3 oz)   07/19/22 1233 102.1 kg (225 lb 1.4 oz)   06/27/22 1428 104.7 kg (230 lb 13.2 oz)       Pt recently had labs done for rheum. She is seeing a slow change in that regard.     BP has been stable. On nifedipine.     Is tired-3 kids at home. Had [place] Palsy; off med from neuro standpoint. Pt tapering prednisone and should be off this week. Eyes are closing, less dry. She still has some right lower facial droop.      Taking ozempic and metformin. Tolerating. No personal or fam hx of MEN-2, medullary thyroid ca, pancreatitis. Pt is not breast feeding. Not planning to get pregnant; taking birth control  Pt has no hx of biliary dysfunction. Eating smaller portions but not really paying attention to what she is eating. Weight is down 11 lb since 6/27/22.     BP has stabilized.        HPI  Review of Systems   Constitutional: Positive for fatigue. Negative for activity change, appetite change and fever.   HENT: Negative for mouth dryness and goiter.    Eyes: Negative for visual disturbance.   Respiratory: Negative for apnea, cough, chest tightness and shortness of breath.    Cardiovascular: Negative for chest pain, palpitations and leg swelling.   Gastrointestinal: Negative for abdominal pain, constipation and diarrhea.   Endocrine: Negative for cold intolerance, heat intolerance, polydipsia, polyphagia and polyuria.   Genitourinary: Negative for frequency and menstrual problem.   Musculoskeletal: Negative for arthralgias and myalgias.   Integumentary:  Negative for color change and rash.   Psychiatric/Behavioral: Negative for sleep disturbance. The patient is not nervous/anxious.          Objective:      Physical Exam  Vitals and nursing note reviewed.    Constitutional:       General: She is not in acute distress.  HENT:      Head: Normocephalic and atraumatic.      Mouth/Throat:      Pharynx: Oropharynx is clear.   Eyes:      General: No scleral icterus.     Pupils: Pupils are equal, round, and reactive to light.   Neck:      Comments: No TM  Cardiovascular:      Rate and Rhythm: Normal rate and regular rhythm.      Pulses: Normal pulses.      Heart sounds: Normal heart sounds. No murmur heard.    No friction rub. No gallop.   Pulmonary:      Effort: Pulmonary effort is normal. No respiratory distress.      Breath sounds: Normal breath sounds. No wheezing, rhonchi or rales.   Abdominal:      General: Bowel sounds are normal. There is no distension.      Palpations: Abdomen is soft.      Tenderness: There is no abdominal tenderness.   Musculoskeletal:         General: No swelling.      Cervical back: Normal range of motion and neck supple. No tenderness.   Lymphadenopathy:      Cervical: No cervical adenopathy.   Skin:     General: Skin is warm.      Findings: No erythema or rash.   Neurological:      Mental Status: She is alert and oriented to person, place, and time.   Psychiatric:         Mood and Affect: Mood normal.         Behavior: Behavior normal.         Assessment:                          ICD-10-CM ICD-9-CM   1. Insulin resistance  E88.81 277.7   2. Bell palsy  G51.0 351.0   3. Class 3 severe obesity with serious comorbidity and body mass index (BMI) of 40.0 to 44.9 in adult, unspecified obesity type  E66.01 278.01    Z68.41 V85.41   4. Hypertension, unspecified type  I10 401.9     Plan:       Insulin resistance  -     Ambulatory Referral/Consult to Nutrition Services - [facility]; Future; Expected date: 08/23/2022  -     semaglutide (OZEMPIC) 1 mg/dose (4 mg/3 mL); Inject 1 mg into the skin every 7 days.  Dispense: 1 pen; Refill: 1  DW pt watching added sugars; she is already doing portion control; has started moving a little    Bell  palsy  Improving--still some residual R sided defect  Keep f/u     Pt also has rheum f/u     Class 3 severe obesity with serious comorbidity and body mass index (BMI) of 40.0 to 44.9 in adult, unspecified obesity type  -     Ambulatory Referral/Consult to Nutrition Services - [facility]; Future; Expected date: 08/23/2022  -     semaglutide (OZEMPIC) 1 mg/dose (4 mg/3 mL); Inject 1 mg into the skin every 7 days.  Dispense: 1 pen; Refill: 1  D/W pt  Goal weight: 180s at this time with 200 lb first pt has already lost 5% of body weight; goal is 10%. She   Reviewed short term goals and actionable changes 1-2 per week.   Tried to balance going back to work with bursts of activity. Sleep is okay overall  Multiple recent health issues ([place] Palsy with residual defects; recently post partum and resolution of increased IC pressure while pregnant).    Hypertension, unspecified type  Stable; has done well post partum: on nifedipine  Continue stress management  Slight fatigue but improving this week       Insurance referral for dietician; pt to f/u in 6 weeks  30+ min spent with pt

## 2022-08-22 ENCOUNTER — PATIENT MESSAGE (OUTPATIENT)
Dept: PRIMARY CARE CLINIC | Facility: CLINIC | Age: 32
End: 2022-08-22
Payer: COMMERCIAL

## 2022-08-23 DIAGNOSIS — E66.01 CLASS 3 SEVERE OBESITY WITH SERIOUS COMORBIDITY AND BODY MASS INDEX (BMI) OF 40.0 TO 44.9 IN ADULT, UNSPECIFIED OBESITY TYPE: Chronic | ICD-10-CM

## 2022-08-23 DIAGNOSIS — E88.819 INSULIN RESISTANCE: ICD-10-CM

## 2022-08-23 RX ORDER — SEMAGLUTIDE 1.34 MG/ML
1 INJECTION, SOLUTION SUBCUTANEOUS
Qty: 1 PEN | Refills: 1 | Status: SHIPPED | OUTPATIENT
Start: 2022-08-23 | End: 2022-10-13 | Stop reason: SDUPTHER

## 2022-08-31 ENCOUNTER — PATIENT MESSAGE (OUTPATIENT)
Dept: RHEUMATOLOGY | Facility: CLINIC | Age: 32
End: 2022-08-31
Payer: COMMERCIAL

## 2022-08-31 ENCOUNTER — PATIENT MESSAGE (OUTPATIENT)
Dept: NEUROLOGY | Facility: CLINIC | Age: 32
End: 2022-08-31
Payer: COMMERCIAL

## 2022-08-31 DIAGNOSIS — G51.0 BELL'S PALSY: Primary | ICD-10-CM

## 2022-09-07 ENCOUNTER — PATIENT MESSAGE (OUTPATIENT)
Dept: RHEUMATOLOGY | Facility: CLINIC | Age: 32
End: 2022-09-07
Payer: COMMERCIAL

## 2022-09-07 ENCOUNTER — PATIENT MESSAGE (OUTPATIENT)
Dept: PRIMARY CARE CLINIC | Facility: CLINIC | Age: 32
End: 2022-09-07
Payer: COMMERCIAL

## 2022-09-14 ENCOUNTER — PATIENT MESSAGE (OUTPATIENT)
Dept: REHABILITATION | Facility: HOSPITAL | Age: 32
End: 2022-09-14
Payer: COMMERCIAL

## 2022-09-14 DIAGNOSIS — G51.0 BELL'S PALSY: Primary | ICD-10-CM

## 2022-09-21 ENCOUNTER — PATIENT MESSAGE (OUTPATIENT)
Dept: PRIMARY CARE CLINIC | Facility: CLINIC | Age: 32
End: 2022-09-21
Payer: COMMERCIAL

## 2022-09-22 ENCOUNTER — PATIENT MESSAGE (OUTPATIENT)
Dept: PRIMARY CARE CLINIC | Facility: CLINIC | Age: 32
End: 2022-09-22
Payer: COMMERCIAL

## 2022-09-29 ENCOUNTER — PATIENT MESSAGE (OUTPATIENT)
Dept: REHABILITATION | Facility: HOSPITAL | Age: 32
End: 2022-09-29
Payer: COMMERCIAL

## 2022-09-29 ENCOUNTER — PATIENT MESSAGE (OUTPATIENT)
Dept: PRIMARY CARE CLINIC | Facility: CLINIC | Age: 32
End: 2022-09-29
Payer: COMMERCIAL

## 2022-10-05 ENCOUNTER — OFFICE VISIT (OUTPATIENT)
Dept: PRIMARY CARE CLINIC | Facility: CLINIC | Age: 32
End: 2022-10-05
Payer: COMMERCIAL

## 2022-10-05 DIAGNOSIS — F41.9 ANXIETY: ICD-10-CM

## 2022-10-05 DIAGNOSIS — G51.0 BELL'S PALSY: ICD-10-CM

## 2022-10-05 DIAGNOSIS — I10 PRIMARY HYPERTENSION: ICD-10-CM

## 2022-10-05 DIAGNOSIS — E88.819 INSULIN RESISTANCE: Primary | ICD-10-CM

## 2022-10-05 DIAGNOSIS — Z86.32 HISTORY OF GESTATIONAL DIABETES: ICD-10-CM

## 2022-10-05 DIAGNOSIS — R73.03 PRE-DIABETES: ICD-10-CM

## 2022-10-05 PROCEDURE — 3044F HG A1C LEVEL LT 7.0%: CPT | Mod: CPTII,95,, | Performed by: FAMILY MEDICINE

## 2022-10-05 PROCEDURE — 99214 PR OFFICE/OUTPT VISIT, EST, LEVL IV, 30-39 MIN: ICD-10-PCS | Mod: 95,,, | Performed by: FAMILY MEDICINE

## 2022-10-05 PROCEDURE — 1159F PR MEDICATION LIST DOCUMENTED IN MEDICAL RECORD: ICD-10-PCS | Mod: CPTII,95,, | Performed by: FAMILY MEDICINE

## 2022-10-05 PROCEDURE — 3044F PR MOST RECENT HEMOGLOBIN A1C LEVEL <7.0%: ICD-10-PCS | Mod: CPTII,95,, | Performed by: FAMILY MEDICINE

## 2022-10-05 PROCEDURE — 99214 OFFICE O/P EST MOD 30 MIN: CPT | Mod: 95,,, | Performed by: FAMILY MEDICINE

## 2022-10-05 PROCEDURE — 1159F MED LIST DOCD IN RCRD: CPT | Mod: CPTII,95,, | Performed by: FAMILY MEDICINE

## 2022-10-05 PROCEDURE — 1160F PR REVIEW ALL MEDS BY PRESCRIBER/CLIN PHARMACIST DOCUMENTED: ICD-10-PCS | Mod: CPTII,95,, | Performed by: FAMILY MEDICINE

## 2022-10-05 PROCEDURE — 1160F RVW MEDS BY RX/DR IN RCRD: CPT | Mod: CPTII,95,, | Performed by: FAMILY MEDICINE

## 2022-10-05 NOTE — PROGRESS NOTES
Subjective:       Patient ID: Fabiola Viera is a 32 y.o. female.  Pmhx, fam hx, soc hx, surg hx, allergies, med list reviewed   .  Chief Complaint:   Follow up ozempic/insulin resistance. Had seen Dr Davison-faustina him in interval since our last appt she had some increased hunger. Tried phentermine in the past and then tried the lower dose version (15 mg capsule) recently. She noticed some sleep disruption with it and felt like it may have slightly affected her anxiety (no crisis; no considerable worsening). Stopped med due to this and symptoms went away. She is willing to try lomaira or increase ozempic to 2 mg. Dr Davison was agreeable to her taking the ozempic. NO n/v/abd pain/diarrhea. Reports has tolerated.     Weight down to 199/200 so has lost over 30 lb--  Wt Readings from Last 3 Encounters:   08/16/22 1023 99.6 kg (219 lb 9.3 oz)   07/19/22 1233 102.1 kg (225 lb 1.4 oz)   06/27/22 1428 104.7 kg (230 lb 13.2 oz)      Pt did stop her bp med (hctz) due to feeling fatigued since still taking with the weight loss. She has checked bp at home and has been good. Does have upcoming pcp appt.     Denies any cp/sob. No headaches.   HPI  Review of Systems   Constitutional:  Negative for activity change, appetite change, fatigue and fever.   HENT:  Negative for mouth dryness and goiter.    Eyes:  Negative for visual disturbance.   Respiratory:  Negative for apnea, cough, chest tightness and shortness of breath.    Cardiovascular:  Negative for chest pain, palpitations and leg swelling.   Gastrointestinal:  Negative for abdominal pain, constipation and diarrhea.   Endocrine: Negative for cold intolerance, heat intolerance, polydipsia, polyphagia and polyuria.   Genitourinary:  Negative for frequency and menstrual problem.   Musculoskeletal:  Negative for arthralgias and myalgias.   Integumentary:  Negative for color change and rash.   Psychiatric/Behavioral:  Negative for sleep disturbance. The patient is not  nervous/anxious.        Objective:      Physical Exam  Constitutional:       General: She is not in acute distress.     Appearance: Normal appearance.   HENT:      Head: Normocephalic and atraumatic.   Eyes:      General: No scleral icterus.  Pulmonary:      Effort: Pulmonary effort is normal. No respiratory distress.   Neurological:      Mental Status: She is alert and oriented to person, place, and time.   Psychiatric:         Mood and Affect: Mood normal.         Behavior: Behavior normal.       Assessment:         ICD-10-CM ICD-9-CM   1. Insulin resistance  E88.81 277.7   2. BMI 40.0-44.9, adult  Z68.41 V85.41   3. Pre-diabetes  R73.03 790.29   4. History of gestational diabetes  Z86.32 V12.21   5. Primary hypertension  I10 401.9   6. Bell's palsy  G51.0 351.0        Plan:       Insulin resistance/Pre diabetes    BMI 40.0-44.9, adult  BMI decreased to 36.4 (was > 40)  Doing lifestyle interventions, ozempic 1 mg, had trial of low dose phentermine       Primary hypertension  Off meds; keep pcp appt    Bell's palsy  Stable; pt participating in therapy    Anxiety  Chronic/stable on current medications    30+ min spent with patient    Called and left msg with Dr Davison's office--spoke with him; see notes   reviewed; he had initially rx phentermine; switching to lomaira

## 2022-10-07 ENCOUNTER — PATIENT MESSAGE (OUTPATIENT)
Dept: PRIMARY CARE CLINIC | Facility: CLINIC | Age: 32
End: 2022-10-07
Payer: COMMERCIAL

## 2022-10-07 DIAGNOSIS — R73.03 PRE-DIABETES: ICD-10-CM

## 2022-10-07 NOTE — TELEPHONE ENCOUNTER
See Retracehart note; reviewed and dw Dr Davison Wed.   He is agreeable to pt trying low dose lomaira. Pt notified via [employer] msg. Would likely do better with this than increase 2 mg ozempic--can try once daily-twice daily.    reviewed. Pt to send update next week.

## 2022-10-13 ENCOUNTER — PATIENT MESSAGE (OUTPATIENT)
Dept: PRIMARY CARE CLINIC | Facility: CLINIC | Age: 32
End: 2022-10-13
Payer: COMMERCIAL

## 2022-10-13 DIAGNOSIS — E88.819 INSULIN RESISTANCE: ICD-10-CM

## 2022-10-13 DIAGNOSIS — E66.01 CLASS 3 SEVERE OBESITY WITH SERIOUS COMORBIDITY AND BODY MASS INDEX (BMI) OF 40.0 TO 44.9 IN ADULT, UNSPECIFIED OBESITY TYPE: Chronic | ICD-10-CM

## 2022-10-13 RX ORDER — SEMAGLUTIDE 1.34 MG/ML
1 INJECTION, SOLUTION SUBCUTANEOUS
Qty: 1 PEN | Refills: 1 | Status: SHIPPED | OUTPATIENT
Start: 2022-10-13 | End: 2022-10-17

## 2022-10-28 ENCOUNTER — PATIENT MESSAGE (OUTPATIENT)
Dept: PRIMARY CARE CLINIC | Facility: CLINIC | Age: 32
End: 2022-10-28
Payer: COMMERCIAL

## 2022-10-31 ENCOUNTER — PATIENT MESSAGE (OUTPATIENT)
Dept: RHEUMATOLOGY | Facility: CLINIC | Age: 32
End: 2022-10-31
Payer: COMMERCIAL

## 2022-11-29 ENCOUNTER — PATIENT MESSAGE (OUTPATIENT)
Dept: PRIMARY CARE CLINIC | Facility: CLINIC | Age: 32
End: 2022-11-29
Payer: COMMERCIAL

## 2022-11-29 DIAGNOSIS — R73.03 PRE-DIABETES: ICD-10-CM

## 2022-12-01 ENCOUNTER — PATIENT MESSAGE (OUTPATIENT)
Dept: PRIMARY CARE CLINIC | Facility: CLINIC | Age: 32
End: 2022-12-01
Payer: COMMERCIAL

## 2022-12-02 ENCOUNTER — OFFICE VISIT (OUTPATIENT)
Dept: PRIMARY CARE CLINIC | Facility: CLINIC | Age: 32
End: 2022-12-02
Payer: COMMERCIAL

## 2022-12-02 VITALS — WEIGHT: 178 LBS | HEIGHT: 62 IN | BODY MASS INDEX: 32.76 KG/M2

## 2022-12-02 DIAGNOSIS — E66.9 CLASS 1 OBESITY WITH SERIOUS COMORBIDITY AND BODY MASS INDEX (BMI) OF 33.0 TO 33.9 IN ADULT, UNSPECIFIED OBESITY TYPE: ICD-10-CM

## 2022-12-02 DIAGNOSIS — F41.9 ANXIETY: ICD-10-CM

## 2022-12-02 DIAGNOSIS — L70.9 ACNE, UNSPECIFIED ACNE TYPE: ICD-10-CM

## 2022-12-02 DIAGNOSIS — R73.03 PRE-DIABETES: Primary | ICD-10-CM

## 2022-12-02 DIAGNOSIS — E88.819 INSULIN RESISTANCE: ICD-10-CM

## 2022-12-02 PROCEDURE — 99214 OFFICE O/P EST MOD 30 MIN: CPT | Mod: 95,,, | Performed by: FAMILY MEDICINE

## 2022-12-02 PROCEDURE — 3008F BODY MASS INDEX DOCD: CPT | Mod: CPTII,95,, | Performed by: FAMILY MEDICINE

## 2022-12-02 PROCEDURE — 3044F HG A1C LEVEL LT 7.0%: CPT | Mod: CPTII,95,, | Performed by: FAMILY MEDICINE

## 2022-12-02 PROCEDURE — 99214 PR OFFICE/OUTPT VISIT, EST, LEVL IV, 30-39 MIN: ICD-10-PCS | Mod: 95,,, | Performed by: FAMILY MEDICINE

## 2022-12-02 PROCEDURE — 3044F PR MOST RECENT HEMOGLOBIN A1C LEVEL <7.0%: ICD-10-PCS | Mod: CPTII,95,, | Performed by: FAMILY MEDICINE

## 2022-12-02 PROCEDURE — 3008F PR BODY MASS INDEX (BMI) DOCUMENTED: ICD-10-PCS | Mod: CPTII,95,, | Performed by: FAMILY MEDICINE

## 2022-12-02 RX ORDER — SEMAGLUTIDE 2.68 MG/ML
2 INJECTION, SOLUTION SUBCUTANEOUS
Qty: 3 ML | Refills: 1 | Status: SHIPPED | OUTPATIENT
Start: 2022-12-02 | End: 2023-01-30

## 2022-12-02 RX ORDER — MUPIROCIN 20 MG/G
OINTMENT TOPICAL 3 TIMES DAILY
Qty: 22 G | Refills: 1 | Status: SHIPPED | OUTPATIENT
Start: 2022-12-02

## 2022-12-02 NOTE — PROGRESS NOTES
Subjective:       Patient ID: Fabiola Viera is a 32 y.o. female.  Pmhx, fam hx, soc hx, surg hx, allergies, med list reviewed    The patient location is: [place]  The chief complaint leading to consultation is: follow up     Visit type: audiovisual    Face to Face time with patient: 22  31 minutes of total time spent on the encounter, which includes face to face time and non-face to face time preparing to see the patient (eg, review of tests), Obtaining and/or reviewing separately obtained history, Documenting clinical information in the electronic or other health record, Independently interpreting results (not separately reported) and communicating results to the patient/family/caregiver, or Care coordination (not separately reported).         Each patient to whom he or she provides medical services by telemedicine is:  (1) informed of the relationship between the physician and patient and the respective role of any other health care provider with respect to management of the patient; and (2) notified that he or she may decline to receive medical services by telemedicine and may withdraw from such care at any time.    Notes:     Chief Complaint: follow up    NO issues with lomaira with glp-1. Sleep has been good. Sometimes wakes up since daughter has still been eating. No mood changes other than has had some anxiety she feels is unrelated to lomaira. Job is stressful.     Pt has noticed more weight loss. She was at 178 this am.   Has seen both myself and Dr. Davison.    Pt seen by Dr Olvera; anxiety medication increased this week. Breakouts from hormones; pt has noticed some picking. Some folliculitis/pimples. Sees derm end of Dec. Sees Dr Monique.       Weight is significantly improved: 18% weight loss since summer. Can wear clothes again. Energy is overall improved.     Pt is due for Hgb A1c. Plans to have done.       HPI  Review of Systems   Constitutional:  Negative for activity change, appetite  change, fatigue and fever.   HENT:  Negative for mouth dryness and goiter.    Eyes:  Negative for visual disturbance.   Respiratory:  Negative for apnea, cough, chest tightness and shortness of breath.    Cardiovascular:  Negative for chest pain, palpitations and leg swelling.   Gastrointestinal:  Negative for abdominal pain, constipation and diarrhea.   Endocrine: Negative for cold intolerance, heat intolerance, polydipsia, polyphagia and polyuria.   Genitourinary:  Negative for frequency and menstrual problem.   Musculoskeletal:  Negative for arthralgias and myalgias.   Integumentary:  Positive for rash (folliculitis). Negative for color change.   Psychiatric/Behavioral:  Negative for agitation, behavioral problems and sleep disturbance. The patient is not nervous/anxious.        Objective:      Physical Exam  Constitutional:       General: She is not in acute distress.     Appearance: Normal appearance.   HENT:      Head: Normocephalic and atraumatic.   Eyes:      General: No scleral icterus.  Pulmonary:      Effort: Pulmonary effort is normal. No respiratory distress.   Neurological:      Mental Status: She is alert and oriented to person, place, and time.   Psychiatric:         Mood and Affect: Mood normal.         Behavior: Behavior normal.       Assessment:         ICD-10-CM ICD-9-CM   1. Pre-diabetes  R73.03 790.29   2. Anxiety  F41.9 300.00   3. Insulin resistance  E88.81 277.7   4. Acne, unspecified acne type  L70.9 706.1   5. Class 1 obesity with serious comorbidity and body mass index (BMI) of 33.0 to 33.9 in adult, unspecified obesity type  E66.9 278.00    Z68.33 V85.33            Plan:       Pre-diabetes  Comments:  will call out 2 mg of ozempic  Orders:  -     semaglutide (OZEMPIC) 2 mg/dose (8 mg/3 mL) PnIj; Inject 2 mg into the skin every 7 days.  Dispense: 3 mL; Refill: 1  Continue lifestyle interventions    Anxiety  Comments:  more work related;   decrease lomaira to once/day--was taking at  night  take mid day  Had anxiety med decreased    Insulin resistance  -     semaglutide (OZEMPIC) 2 mg/dose (8 mg/3 mL) PnIj; Inject 2 mg into the skin every 7 days.  Dispense: 3 mL; Refill: 1    Acne, unspecified acne type  Comments:  acne/folliculitis; will send in some topical mupicorin for pt; can use nasal prophylaxis if needs  no cellulitis  Orders:  -     mupirocin (BACTROBAN) 2 % ointment; Apply topically 3 (three) times daily.  Dispense: 22 g; Refill: 1  Keep appt with Dr Monique    Class 1 obesity with serious comorbidity and body mass index (BMI) of 33.0 to 33.9 in adult, unspecified obesity type  -     semaglutide (OZEMPIC) 2 mg/dose (8 mg/3 mL) PnIj; Inject 2 mg into the skin every 7 days.  Dispense: 3 mL; Refill: 1        Pt reminded to use appropriate contraception with ozempic  Pt to call for appt in Jan/Feb after Hipolito and Dr Davison

## 2022-12-29 ENCOUNTER — PATIENT MESSAGE (OUTPATIENT)
Dept: PRIMARY CARE CLINIC | Facility: CLINIC | Age: 32
End: 2022-12-29
Payer: COMMERCIAL

## 2023-05-01 DIAGNOSIS — G89.29 CHRONIC FACIAL PAIN: ICD-10-CM

## 2023-05-01 DIAGNOSIS — R51.9 CHRONIC FACIAL PAIN: ICD-10-CM

## 2023-05-01 RX ORDER — GABAPENTIN 300 MG/1
CAPSULE ORAL
Qty: 90 CAPSULE | Refills: 11 | Status: SHIPPED | OUTPATIENT
Start: 2023-05-01

## 2023-05-01 NOTE — TELEPHONE ENCOUNTER
Pt requesting refill of Gabapentin.  Preferred pharmacy is up to date in [facility].

## 2024-03-27 ENCOUNTER — PATIENT MESSAGE (OUTPATIENT)
Dept: PRIMARY CARE CLINIC | Facility: CLINIC | Age: 34
End: 2024-03-27

## 2024-03-27 ENCOUNTER — OFFICE VISIT (OUTPATIENT)
Dept: PRIMARY CARE CLINIC | Facility: CLINIC | Age: 34
End: 2024-03-27
Payer: COMMERCIAL

## 2024-03-27 VITALS
HEART RATE: 93 BPM | BODY MASS INDEX: 31.8 KG/M2 | OXYGEN SATURATION: 97 % | WEIGHT: 172.81 LBS | DIASTOLIC BLOOD PRESSURE: 84 MMHG | RESPIRATION RATE: 18 BRPM | TEMPERATURE: 98 F | SYSTOLIC BLOOD PRESSURE: 130 MMHG | HEIGHT: 62 IN

## 2024-03-27 DIAGNOSIS — Z86.32 HISTORY OF GESTATIONAL DIABETES: ICD-10-CM

## 2024-03-27 DIAGNOSIS — R73.03 PRE-DIABETES: ICD-10-CM

## 2024-03-27 DIAGNOSIS — E66.9 CLASS 1 OBESITY WITH SERIOUS COMORBIDITY AND BODY MASS INDEX (BMI) OF 33.0 TO 33.9 IN ADULT, UNSPECIFIED OBESITY TYPE: ICD-10-CM

## 2024-03-27 DIAGNOSIS — E88.819 INSULIN RESISTANCE: ICD-10-CM

## 2024-03-27 DIAGNOSIS — F41.9 ANXIETY: ICD-10-CM

## 2024-03-27 DIAGNOSIS — E66.9 CLASS 1 OBESITY WITH SERIOUS COMORBIDITY AND BODY MASS INDEX (BMI) OF 31.0 TO 31.9 IN ADULT, UNSPECIFIED OBESITY TYPE: ICD-10-CM

## 2024-03-27 PROBLEM — E66.2 CLASS 1 OBESITY WITH ALVEOLAR HYPOVENTILATION, SERIOUS COMORBIDITY, AND BODY MASS INDEX (BMI) OF 31.0 TO 31.9 IN ADULT: Status: ACTIVE | Noted: 2022-06-16

## 2024-03-27 PROBLEM — E66.811 CLASS 1 OBESITY WITH ALVEOLAR HYPOVENTILATION, SERIOUS COMORBIDITY, AND BODY MASS INDEX (BMI) OF 31.0 TO 31.9 IN ADULT: Status: ACTIVE | Noted: 2022-06-16

## 2024-03-27 PROCEDURE — 99214 OFFICE O/P EST MOD 30 MIN: CPT | Mod: S$GLB,,, | Performed by: FAMILY MEDICINE

## 2024-03-27 PROCEDURE — 99999 PR PBB SHADOW E&M-EST. PATIENT-LVL IV: CPT | Mod: PBBFAC,,, | Performed by: FAMILY MEDICINE

## 2024-03-27 RX ORDER — BUPROPION HYDROCHLORIDE 100 MG/1
100 TABLET, EXTENDED RELEASE ORAL DAILY
Qty: 30 TABLET | Refills: 1 | Status: SHIPPED | OUTPATIENT
Start: 2024-03-27 | End: 2025-03-27

## 2024-03-27 RX ORDER — SEMAGLUTIDE 2.68 MG/ML
INJECTION, SOLUTION SUBCUTANEOUS
Qty: 3 ML | Refills: 1 | Status: SHIPPED | OUTPATIENT
Start: 2024-03-27 | End: 2024-05-23

## 2024-03-27 RX ORDER — SEMAGLUTIDE 1.7 MG/.75ML
1.7 INJECTION, SOLUTION SUBCUTANEOUS
Qty: 3 ML | Refills: 0 | Status: SHIPPED | OUTPATIENT
Start: 2024-03-27

## 2024-03-27 NOTE — PROGRESS NOTES
Subjective     Patient ID: Fabiola Viera is a 33 y.o. female.    LOV with me   Had annual       Had seen WELLINGTON Gonzalez: pt paid for labs but did not finish    A1c  prediabetes    Chief Complaint: weight gain     Pt has tried lomaira, glp1. Has seen both me and Dr Davison in the past.     Was seeing Dr Davison; on ozempic 2 mg. Insurance does not cover her to see [facility]. Currently on no meds. NO ozempic in 2 weeks due to being out. Gained 3 lb in past few weeks.     Baby is almost 2 years old.     Starting weight 215 after baby; started ozempic in 2022: was 225.   Maintained over past year.     Was on phentermine, ozempic in the past.     Mood has been good.     While on ozempic:     Pt reports doing well overall.   No throat pain, hoarseness, palpable neck mass.   Nausea: none  Abd pain: none  Constipation: none; intermittent watery stools  Gastroparesis: none  GERD: mild  Energy: good  Lifestyle changes: as above  Mental Health: pt states good; no acute changes   Contraception: none now; pt states can use condoms; reminded should not get pregnant while taking   Has seen neurology for papilledema.     Not on lomaria. No bp meds currently.     Anxiety has been stabl off meds. Dad passed away in October.     Pt would like to meet with dietitian. Overall at times not getting enough protein.     HPI       Objective     PAST MEDICAL HISTORY:  Past Medical History:   Diagnosis Date    Abnormal Pap smear of cervix     Anxiety     Encounter for general adult medical examination without abnormal findings 2016    Encounter for screening for infections with predominantly sexual mode of transmission     Hypertension     Insulin resistance     Insulin resistance     Insulin resistance     Irregular menses     Labyrinthine disorder     Polycystic ovaries     Sciatica          PAST SURGICAL HISTORY:  Past Surgical History:   Procedure Laterality Date    ADENOIDECTOMY       SECTION       TONSILLECTOMY         FAMILY HISTORY:  Family History   Problem Relation Age of Onset    Hypertension Father     Prostate cancer Father     Depression Maternal Grandmother     Hypertension Maternal Grandmother     Cancer Maternal Grandfather     Colon cancer Maternal Grandfather     Hypertension Paternal Grandmother     Prostate cancer Paternal Grandfather     Diabetes Other     Breast cancer Other           SOCIAL HISTORY:  Social History     Social History Narrative    Not on file       MEDICATIONS:  Medications have been reviewed.    ALLERGIES:  Allergies have been reviewed.    Vitals:    03/27/24 0914   BP: 130/84   Pulse: 93   Resp: 18   Temp: 98.2 °F (36.8 °C)     Wt Readings from Last 10 Encounters:   03/27/24 78.4 kg (172 lb 13.5 oz)   06/15/23 77.1 kg (170 lb)   12/02/22 80.7 kg (178 lb)   08/16/22 99.6 kg (219 lb 9.3 oz)   07/19/22 102.1 kg (225 lb 1.4 oz)   06/27/22 104.7 kg (230 lb 13.2 oz)   06/16/22 103.4 kg (227 lb 15.3 oz)   06/14/22 102.5 kg (226 lb)   05/31/22 98.4 kg (217 lb)   05/16/22 98.9 kg (218 lb)       Lab Results   Component Value Date    WBC 9.47 07/27/2022    HGB 11.1 (L) 07/27/2022    HCT 38.8 07/27/2022     07/27/2022    ALT 26 07/27/2022    AST 14 07/27/2022     07/27/2022    K 3.0 (L) 07/27/2022    CL 97 07/27/2022    CREATININE 0.7 07/27/2022    BUN 14 07/27/2022    CO2 31 (H) 07/27/2022    HGBA1C 6.1 (H) 07/27/2022    MICROALBUR 7.8 02/13/2018       Review of Systems   Constitutional:  Negative for activity change, appetite change, fatigue and fever.   HENT:  Negative for mouth dryness and goiter.    Eyes:  Negative for visual disturbance.   Respiratory:  Negative for apnea, cough, chest tightness and shortness of breath.    Cardiovascular:  Negative for chest pain, palpitations and leg swelling.   Gastrointestinal:  Negative for abdominal pain, constipation, diarrhea, nausea, vomiting and reflux.   Endocrine: Negative for cold intolerance, heat intolerance,  polydipsia, polyphagia and polyuria.   Genitourinary:  Negative for frequency and menstrual problem.   Musculoskeletal:  Negative for arthralgias and myalgias.   Integumentary:  Negative for color change and rash.   Neurological:  Negative for dizziness, tremors, seizures, syncope, weakness and headaches.   Psychiatric/Behavioral:  Negative for self-injury, sleep disturbance and suicidal ideas. The patient is not nervous/anxious.        Physical Exam  Vitals and nursing note reviewed.   Constitutional:       General: She is not in acute distress.  HENT:      Head: Normocephalic and atraumatic.      Mouth/Throat:      Pharynx: Oropharynx is clear.   Eyes:      General: No scleral icterus.     Pupils: Pupils are equal, round, and reactive to light.   Neck:      Comments: No TM  Cardiovascular:      Rate and Rhythm: Normal rate and regular rhythm.      Pulses: Normal pulses.      Heart sounds: Normal heart sounds. No murmur heard.     No friction rub. No gallop.   Pulmonary:      Effort: Pulmonary effort is normal. No respiratory distress.      Breath sounds: Normal breath sounds. No wheezing, rhonchi or rales.   Abdominal:      General: Bowel sounds are normal. There is no distension.      Palpations: Abdomen is soft.      Tenderness: There is no abdominal tenderness.   Musculoskeletal:         General: No swelling.      Cervical back: Normal range of motion and neck supple. No tenderness.   Lymphadenopathy:      Cervical: No cervical adenopathy.   Skin:     General: Skin is warm.      Findings: No erythema or rash.   Neurological:      Mental Status: She is alert and oriented to person, place, and time.   Psychiatric:         Mood and Affect: Mood normal.         Behavior: Behavior normal.              Assessment and Plan       ICD-10-CM ICD-9-CM   1. Insulin resistance  E88.819 277.7   2. Anxiety  F41.9 300.00   3. History of gestational diabetes  Z86.32 V12.21   4. Class 1 obesity with serious comorbidity and body  mass index (BMI) of 31.0 to 31.9 in adult, unspecified obesity type  E66.9 278.00    Z68.31 V85.31        Insulin resistance  Comments:  pt will have labs--asked her to let me know  Orders:  -     semaglutide, weight loss, (WEGOVY) 1.7 mg/0.75 mL PnIj; Inject 1.7 mg into the skin every 7 days.  Dispense: 2 mL; Refill: 0    Anxiety  Comments:  self care    History of gestational diabetes    Class 1 obesity with serious comorbidity and body mass index (BMI) of 31.0 to 31.9 in adult, unspecified obesity type  -     semaglutide, weight loss, (WEGOVY) 1.7 mg/0.75 mL PnIj; Inject 1.7 mg into the skin every 7 days.  Dispense: 2 mL; Refill: 0  Reviewed with pt risks/benefits/se's  Can increase to 2.5 mg after initiation       Obtain records Dr Davison  Please let me know how you are doing     Addendum: wegovy not covered; pt is agreeable to try ozempic and low dose wellbutrin.   Reviewed risks/benefits/se's at length      Follow up in about 3 months (around 6/27/2024), or if symptoms worsen or fail to improve.

## 2024-03-27 NOTE — PATIENT INSTRUCTIONS
Please do labs already paid for and let me know     Please let me know how you are doing and we can increase to the 2.4 mg dosage.

## 2024-05-22 DIAGNOSIS — F41.9 ANXIETY: ICD-10-CM

## 2024-05-22 DIAGNOSIS — E88.819 INSULIN RESISTANCE: Primary | ICD-10-CM

## 2024-05-22 DIAGNOSIS — E66.9 CLASS 1 OBESITY WITH SERIOUS COMORBIDITY AND BODY MASS INDEX (BMI) OF 31.0 TO 31.9 IN ADULT, UNSPECIFIED OBESITY TYPE: ICD-10-CM

## 2024-05-22 NOTE — TELEPHONE ENCOUNTER
Refill Routing Note   Medication(s) are not appropriate for processing by [facility] for the following reason(s):        Responsible provider unclear    [facility] action(s):  Defer               Appointments  past 12m or future 3m with PCP    Date Provider   Last Visit   3/27/2024 Suly Sapp MD   Next Visit   7/10/2024 Suly Sapp MD   ED visits in past 90 days: 0        Note composed:11:51 AM 05/22/2024

## 2024-05-23 DIAGNOSIS — R73.03 PRE-DIABETES: ICD-10-CM

## 2024-05-23 DIAGNOSIS — E66.9 CLASS 1 OBESITY WITH SERIOUS COMORBIDITY AND BODY MASS INDEX (BMI) OF 31.0 TO 31.9 IN ADULT, UNSPECIFIED OBESITY TYPE: ICD-10-CM

## 2024-05-23 DIAGNOSIS — E88.819 INSULIN RESISTANCE: ICD-10-CM

## 2024-05-23 RX ORDER — BUPROPION HYDROCHLORIDE 100 MG/1
100 TABLET, EXTENDED RELEASE ORAL DAILY
Qty: 90 TABLET | Refills: 3 | OUTPATIENT
Start: 2024-05-23

## 2024-05-23 RX ORDER — SEMAGLUTIDE 2.68 MG/ML
2 INJECTION, SOLUTION SUBCUTANEOUS
Qty: 9 ML | Refills: 0 | Status: SHIPPED | OUTPATIENT
Start: 2024-05-23 | End: 2024-06-03 | Stop reason: SDUPTHER

## 2024-05-23 NOTE — TELEPHONE ENCOUNTER
Refill Routing Note   Medication(s) are not appropriate for processing by [facility] for the following reason(s):        Responsible provider unclear  Required labs outdated    [facility] action(s):  Defer               Appointments  past 12m or future 3m with PCP    Date Provider   Last Visit   3/27/2024 Suly Sapp MD   Next Visit   7/10/2024 Suly Sapp MD   ED visits in past 90 days: 0        Note composed:4:12 PM 05/23/2024

## 2024-05-23 NOTE — TELEPHONE ENCOUNTER
Spoke with pt;   Pt made aware I will be leaving [facility] in June. She has established pcp so have advised her f/u with them this summer.   Also reviewed potential external community options.   Will not refill wellbutrin due to pt did not like how felt  Met clinic    Pt did change insurance. Reviewed with her zepbound on backorder.     Pt to have one more appt prior to leaving    rdf

## 2024-05-28 ENCOUNTER — TELEPHONE (OUTPATIENT)
Dept: PRIMARY CARE CLINIC | Facility: CLINIC | Age: 34
End: 2024-05-28
Payer: COMMERCIAL

## 2024-06-03 ENCOUNTER — PATIENT MESSAGE (OUTPATIENT)
Dept: PRIMARY CARE CLINIC | Facility: CLINIC | Age: 34
End: 2024-06-03

## 2024-06-03 ENCOUNTER — OFFICE VISIT (OUTPATIENT)
Dept: PRIMARY CARE CLINIC | Facility: CLINIC | Age: 34
End: 2024-06-03
Payer: COMMERCIAL

## 2024-06-03 VITALS
BODY MASS INDEX: 32.57 KG/M2 | SYSTOLIC BLOOD PRESSURE: 126 MMHG | DIASTOLIC BLOOD PRESSURE: 82 MMHG | HEIGHT: 62 IN | TEMPERATURE: 99 F | RESPIRATION RATE: 18 BRPM | WEIGHT: 177 LBS | HEART RATE: 97 BPM | OXYGEN SATURATION: 99 %

## 2024-06-03 DIAGNOSIS — R73.03 PRE-DIABETES: ICD-10-CM

## 2024-06-03 DIAGNOSIS — E66.09 CLASS 1 OBESITY DUE TO EXCESS CALORIES WITH SERIOUS COMORBIDITY AND BODY MASS INDEX (BMI) OF 32.0 TO 32.9 IN ADULT: ICD-10-CM

## 2024-06-03 DIAGNOSIS — E66.9 CLASS 1 OBESITY WITH SERIOUS COMORBIDITY AND BODY MASS INDEX (BMI) OF 31.0 TO 31.9 IN ADULT, UNSPECIFIED OBESITY TYPE: ICD-10-CM

## 2024-06-03 DIAGNOSIS — E88.819 INSULIN RESISTANCE: Primary | ICD-10-CM

## 2024-06-03 PROCEDURE — 1159F MED LIST DOCD IN RCRD: CPT | Mod: CPTII,S$GLB,, | Performed by: FAMILY MEDICINE

## 2024-06-03 PROCEDURE — 3008F BODY MASS INDEX DOCD: CPT | Mod: CPTII,S$GLB,, | Performed by: FAMILY MEDICINE

## 2024-06-03 PROCEDURE — 3079F DIAST BP 80-89 MM HG: CPT | Mod: CPTII,S$GLB,, | Performed by: FAMILY MEDICINE

## 2024-06-03 PROCEDURE — 99999 PR PBB SHADOW E&M-EST. PATIENT-LVL IV: CPT | Mod: PBBFAC,,, | Performed by: FAMILY MEDICINE

## 2024-06-03 PROCEDURE — 1160F RVW MEDS BY RX/DR IN RCRD: CPT | Mod: CPTII,S$GLB,, | Performed by: FAMILY MEDICINE

## 2024-06-03 PROCEDURE — 99213 OFFICE O/P EST LOW 20 MIN: CPT | Mod: S$GLB,,, | Performed by: FAMILY MEDICINE

## 2024-06-03 PROCEDURE — 3074F SYST BP LT 130 MM HG: CPT | Mod: CPTII,S$GLB,, | Performed by: FAMILY MEDICINE

## 2024-06-03 RX ORDER — SEMAGLUTIDE 2.68 MG/ML
2 INJECTION, SOLUTION SUBCUTANEOUS
Qty: 3 ML | Refills: 0 | Status: SHIPPED | OUTPATIENT
Start: 2024-06-03

## 2024-06-03 NOTE — PROGRESS NOTES
Subjective     Patient ID: Fabiola Viera is a 33 y.o. female.    Chief Complaint: Follow-up (Follow up)    Starting bmi > 40; previously saw Dr Davison.   Has lost approxmately 50 lb    Hx of pediabetes, has tried glp1 and lomaira  NOT taking wellbutrin at this time; plans to dw Dr Davison at upcoming appt.     Currently, needs refill of medication/out x 2 weeks. Changed insurances so plans to get wegovy. Was on ozempic 2 mg; plans to go back to ozempic if insurance will not cover.       Pt reports doing well overall.   No throat pain, hoarseness, palpable neck mass  Denies compressive symptoms  No interval change in fam hx  No personal/fam hx of MEN2 or medullary thyroid cancer    No personal hx of pancreatitis  Nausea: limited as above  Abd pain: none   Constipation: none   Gastroparesis: none  GERD: none   Energy: good  Lifestyle changes: as above  Mental Health: pt states good; no acute changes    Contraception:  pt advised should not pregnant  Pt is aware should be off x 8 weeks; has condoms, natural family planning      HPI       Objective     PAST MEDICAL HISTORY:  Past Medical History:   Diagnosis Date    Abnormal Pap smear of cervix     Anxiety     Encounter for general adult medical examination without abnormal findings 2016    Encounter for screening for infections with predominantly sexual mode of transmission     Hypertension     Insulin resistance     Insulin resistance     Insulin resistance     Irregular menses     Labyrinthine disorder     Polycystic ovaries     Sciatica          PAST SURGICAL HISTORY:  Past Surgical History:   Procedure Laterality Date    ADENOIDECTOMY       SECTION      TONSILLECTOMY         FAMILY HISTORY:  Family History   Problem Relation Name Age of Onset    Hypertension Father      Prostate cancer Father      Depression Maternal Grandmother      Hypertension Maternal Grandmother      Cancer Maternal Grandfather      Colon cancer Maternal Grandfather       Hypertension Paternal Grandmother      Prostate cancer Paternal Grandfather      Diabetes Other Aunt     Breast cancer Other Aunt           SOCIAL HISTORY:  Social History     Social History Narrative    Not on file       MEDICATIONS:  Medications have been reviewed.    ALLERGIES:  Allergies have been reviewed.    Vitals:    06/03/24 0949   BP: 126/82   Pulse: 97   Resp: 18   Temp: 98.5 °F (36.9 °C)     Wt Readings from Last 10 Encounters:   06/03/24 80.3 kg (177 lb 0.5 oz)   03/27/24 78.4 kg (172 lb 13.5 oz)   06/15/23 77.1 kg (170 lb)   12/02/22 80.7 kg (178 lb)   08/16/22 99.6 kg (219 lb 9.3 oz)   07/19/22 102.1 kg (225 lb 1.4 oz)   06/27/22 104.7 kg (230 lb 13.2 oz)   06/16/22 103.4 kg (227 lb 15.3 oz)   06/14/22 102.5 kg (226 lb)   05/31/22 98.4 kg (217 lb)       Lab Results   Component Value Date    WBC 9.47 07/27/2022    HGB 11.1 (L) 07/27/2022    HCT 38.8 07/27/2022     07/27/2022    ALT 26 07/27/2022    AST 14 07/27/2022     07/27/2022    K 3.0 (L) 07/27/2022    CL 97 07/27/2022    CREATININE 0.7 07/27/2022    BUN 14 07/27/2022    CO2 31 (H) 07/27/2022    HGBA1C 6.1 (H) 07/27/2022    MICROALBUR 7.8 02/13/2018       Review of Systems   Constitutional:  Negative for activity change, appetite change, fatigue and fever.   HENT:  Negative for mouth dryness, sinus pressure/congestion, trouble swallowing, voice change and goiter.    Eyes:  Negative for visual disturbance.   Respiratory:  Negative for apnea, cough, chest tightness and shortness of breath.    Cardiovascular:  Negative for chest pain, palpitations and leg swelling.   Gastrointestinal:  Negative for abdominal pain, constipation and diarrhea.   Endocrine: Negative for cold intolerance, heat intolerance, polydipsia, polyphagia and polyuria.   Genitourinary:  Negative for frequency and menstrual problem.   Musculoskeletal:  Negative for arthralgias and myalgias.   Integumentary:  Negative for color change and rash.   Neurological:   Negative for dizziness, tremors, syncope, weakness, numbness, headaches and memory loss.   Psychiatric/Behavioral:  Negative for dysphoric mood, sleep disturbance and suicidal ideas. The patient is not nervous/anxious.        Physical Exam  Vitals and nursing note reviewed.   Constitutional:       General: She is not in acute distress.  HENT:      Head: Normocephalic and atraumatic.      Mouth/Throat:      Pharynx: Oropharynx is clear.   Eyes:      General: No scleral icterus.     Pupils: Pupils are equal, round, and reactive to light.   Neck:      Comments: No TM  Cardiovascular:      Rate and Rhythm: Normal rate and regular rhythm.      Pulses: Normal pulses.      Heart sounds: Normal heart sounds. No murmur heard.     No friction rub. No gallop.   Pulmonary:      Effort: Pulmonary effort is normal. No respiratory distress.      Breath sounds: Normal breath sounds. No wheezing, rhonchi or rales.   Abdominal:      General: Bowel sounds are normal. There is no distension.      Palpations: Abdomen is soft.      Tenderness: There is no abdominal tenderness.   Musculoskeletal:         General: No swelling.      Cervical back: Normal range of motion and neck supple. No tenderness.   Lymphadenopathy:      Cervical: No cervical adenopathy.   Skin:     General: Skin is warm.      Findings: No erythema or rash.   Neurological:      Mental Status: She is alert and oriented to person, place, and time.   Psychiatric:         Mood and Affect: Mood normal.         Behavior: Behavior normal.              Assessment and Plan       1. Insulin resistance    2. Class 1 obesity due to excess calories with serious comorbidity and body mass index (BMI) of 32.0 to 32.9 in adult    3. Class 1 obesity with serious comorbidity and body mass index (BMI) of 31.0 to 31.9 in adult, unspecified obesity type    4. Pre-diabetes      Insulin resistance  -     semaglutide (OZEMPIC) 2 mg/dose (8 mg/3 mL) PnIj; Inject 2 mg into the skin every 7 days.  INJECT 2 MG UNDER THE SKIN ONE DAY A WEEK  Dispense: 3 mL; Refill: 0    Class 1 obesity due to excess calories with serious comorbidity and body mass index (BMI) of 32.0 to 32.9 in adult  -     semaglutide (OZEMPIC) 2 mg/dose (8 mg/3 mL) PnIj; Inject 2 mg into the skin every 7 days. INJECT 2 MG UNDER THE SKIN ONE DAY A WEEK  Dispense: 3 mL; Refill: 0    Class 1 obesity with serious comorbidity and body mass index (BMI) of 31.0 to 31.9 in adult, unspecified obesity type    Pre-diabetes  Comments:  will call out 2 mg of ozempic  Orders:  -     semaglutide (OZEMPIC) 2 mg/dose (8 mg/3 mL) PnIj; Inject 2 mg into the skin every 7 days. INJECT 2 MG UNDER THE SKIN ONE DAY A WEEK  Dispense: 3 mL; Refill: 0      Reviewed with pt risks/benefits/se's at length of glp1  Weight at plateau several months; if can get wegovy covered can increase to the 2.4 mg dosage; if not consider adding other agents  Overall feeling like adequate protein but could be more diligent  Encouraged resistance training 20 min 2x/week minimun      Hx of papilledema/pt advised to f/u with hx of [place] Palsy       Pt made aware I will be leaving [facility] in June. She has established pcp so have advised her f/u with them this summer.   Also reviewed potential external community options per pt request. She has an appt with Dr Davison; has seen in the past. Appt June 24th    Follow up if symptoms worsen or fail to improve.

## 2024-12-12 ENCOUNTER — PATIENT MESSAGE (OUTPATIENT)
Dept: RESEARCH | Facility: HOSPITAL | Age: 34
End: 2024-12-12
Payer: COMMERCIAL